# Patient Record
Sex: FEMALE | Race: WHITE | NOT HISPANIC OR LATINO | Employment: FULL TIME | URBAN - METROPOLITAN AREA
[De-identification: names, ages, dates, MRNs, and addresses within clinical notes are randomized per-mention and may not be internally consistent; named-entity substitution may affect disease eponyms.]

---

## 2021-08-03 DIAGNOSIS — Z11.59 ENCOUNTER FOR SCREENING FOR OTHER VIRAL DISEASES: ICD-10-CM

## 2021-08-16 RX ORDER — FOLIC ACID 1 MG/1
TABLET ORAL
COMMUNITY
Start: 2021-06-11 | End: 2024-10-03

## 2021-08-16 RX ORDER — PRENATAL VIT/IRON FUM/FOLIC AC 27MG-0.8MG
1 TABLET ORAL DAILY
COMMUNITY

## 2021-08-16 RX ORDER — LEVETIRACETAM 750 MG/1
750 TABLET ORAL 2 TIMES DAILY
COMMUNITY
Start: 2021-06-14

## 2021-08-16 ASSESSMENT — MIFFLIN-ST. JEOR: SCORE: 1556.37

## 2021-08-17 ENCOUNTER — ANESTHESIA EVENT (OUTPATIENT)
Dept: SURGERY | Facility: AMBULATORY SURGERY CENTER | Age: 31
End: 2021-08-17

## 2021-08-18 ENCOUNTER — ANESTHESIA (OUTPATIENT)
Dept: SURGERY | Facility: AMBULATORY SURGERY CENTER | Age: 31
End: 2021-08-18

## 2021-08-18 ENCOUNTER — HOSPITAL ENCOUNTER (OUTPATIENT)
Facility: AMBULATORY SURGERY CENTER | Age: 31
End: 2021-08-18
Attending: OBSTETRICS & GYNECOLOGY

## 2021-08-18 VITALS
SYSTOLIC BLOOD PRESSURE: 113 MMHG | DIASTOLIC BLOOD PRESSURE: 63 MMHG | BODY MASS INDEX: 36.44 KG/M2 | HEIGHT: 62 IN | RESPIRATION RATE: 16 BRPM | HEART RATE: 70 BPM | TEMPERATURE: 97.6 F | OXYGEN SATURATION: 98 % | WEIGHT: 198 LBS

## 2021-08-18 DIAGNOSIS — T83.32XD INTRAUTERINE DEVICE (IUD) MIGRATION, SUBSEQUENT ENCOUNTER: Primary | ICD-10-CM

## 2021-08-18 LAB
HCG UR QL: NEGATIVE
INTERNAL QC OK POCT: NORMAL

## 2021-08-18 RX ORDER — PROPOFOL 10 MG/ML
INJECTION, EMULSION INTRAVENOUS CONTINUOUS PRN
Status: DISCONTINUED | OUTPATIENT
Start: 2021-08-18 | End: 2021-08-18

## 2021-08-18 RX ORDER — SODIUM CHLORIDE, SODIUM LACTATE, POTASSIUM CHLORIDE, CALCIUM CHLORIDE 600; 310; 30; 20 MG/100ML; MG/100ML; MG/100ML; MG/100ML
INJECTION, SOLUTION INTRAVENOUS CONTINUOUS
Status: DISCONTINUED | OUTPATIENT
Start: 2021-08-18 | End: 2021-08-19 | Stop reason: HOSPADM

## 2021-08-18 RX ORDER — ONDANSETRON 4 MG/1
4 TABLET, ORALLY DISINTEGRATING ORAL EVERY 30 MIN PRN
Status: DISCONTINUED | OUTPATIENT
Start: 2021-08-18 | End: 2021-08-19 | Stop reason: HOSPADM

## 2021-08-18 RX ORDER — LIDOCAINE HYDROCHLORIDE 20 MG/ML
INJECTION, SOLUTION INFILTRATION; PERINEURAL PRN
Status: DISCONTINUED | OUTPATIENT
Start: 2021-08-18 | End: 2021-08-18

## 2021-08-18 RX ORDER — MEPERIDINE HYDROCHLORIDE 25 MG/ML
12.5 INJECTION INTRAMUSCULAR; INTRAVENOUS; SUBCUTANEOUS
Status: DISCONTINUED | OUTPATIENT
Start: 2021-08-18 | End: 2021-08-19 | Stop reason: HOSPADM

## 2021-08-18 RX ORDER — ACETAMINOPHEN 325 MG/1
975 TABLET ORAL ONCE
Status: COMPLETED | OUTPATIENT
Start: 2021-08-18 | End: 2021-08-18

## 2021-08-18 RX ORDER — FENTANYL CITRATE 50 UG/ML
25 INJECTION, SOLUTION INTRAMUSCULAR; INTRAVENOUS EVERY 5 MIN PRN
Status: CANCELLED | OUTPATIENT
Start: 2021-08-18

## 2021-08-18 RX ORDER — ACETAMINOPHEN 325 MG/1
975 TABLET ORAL ONCE
Status: DISCONTINUED | OUTPATIENT
Start: 2021-08-18 | End: 2021-08-19 | Stop reason: HOSPADM

## 2021-08-18 RX ORDER — LIDOCAINE 40 MG/G
CREAM TOPICAL
Status: DISCONTINUED | OUTPATIENT
Start: 2021-08-18 | End: 2021-08-19 | Stop reason: HOSPADM

## 2021-08-18 RX ORDER — OXYCODONE HYDROCHLORIDE 5 MG/1
5 TABLET ORAL EVERY 4 HOURS PRN
Status: DISCONTINUED | OUTPATIENT
Start: 2021-08-18 | End: 2021-08-19 | Stop reason: HOSPADM

## 2021-08-18 RX ORDER — IBUPROFEN 800 MG/1
800 TABLET, FILM COATED ORAL ONCE
Status: DISCONTINUED | OUTPATIENT
Start: 2021-08-18 | End: 2021-08-19 | Stop reason: HOSPADM

## 2021-08-18 RX ORDER — HYDROMORPHONE HCL IN WATER/PF 6 MG/30 ML
0.2 PATIENT CONTROLLED ANALGESIA SYRINGE INTRAVENOUS EVERY 5 MIN PRN
Status: CANCELLED | OUTPATIENT
Start: 2021-08-18

## 2021-08-18 RX ORDER — ALBUTEROL SULFATE 0.83 MG/ML
2.5 SOLUTION RESPIRATORY (INHALATION) EVERY 4 HOURS PRN
Status: CANCELLED | OUTPATIENT
Start: 2021-08-18

## 2021-08-18 RX ORDER — LIDOCAINE HYDROCHLORIDE 10 MG/ML
INJECTION, SOLUTION EPIDURAL; INFILTRATION; INTRACAUDAL; PERINEURAL PRN
Status: DISCONTINUED | OUTPATIENT
Start: 2021-08-18 | End: 2021-08-18 | Stop reason: HOSPADM

## 2021-08-18 RX ORDER — DEXAMETHASONE SODIUM PHOSPHATE 4 MG/ML
INJECTION, SOLUTION INTRA-ARTICULAR; INTRALESIONAL; INTRAMUSCULAR; INTRAVENOUS; SOFT TISSUE PRN
Status: DISCONTINUED | OUTPATIENT
Start: 2021-08-18 | End: 2021-08-18

## 2021-08-18 RX ORDER — KETOROLAC TROMETHAMINE 30 MG/ML
INJECTION, SOLUTION INTRAMUSCULAR; INTRAVENOUS PRN
Status: DISCONTINUED | OUTPATIENT
Start: 2021-08-18 | End: 2021-08-18

## 2021-08-18 RX ORDER — PROPOFOL 10 MG/ML
INJECTION, EMULSION INTRAVENOUS PRN
Status: DISCONTINUED | OUTPATIENT
Start: 2021-08-18 | End: 2021-08-18

## 2021-08-18 RX ORDER — ONDANSETRON 2 MG/ML
4 INJECTION INTRAMUSCULAR; INTRAVENOUS EVERY 30 MIN PRN
Status: DISCONTINUED | OUTPATIENT
Start: 2021-08-18 | End: 2021-08-19 | Stop reason: HOSPADM

## 2021-08-18 RX ORDER — ONDANSETRON 2 MG/ML
INJECTION INTRAMUSCULAR; INTRAVENOUS PRN
Status: DISCONTINUED | OUTPATIENT
Start: 2021-08-18 | End: 2021-08-18

## 2021-08-18 RX ORDER — IBUPROFEN 600 MG/1
600 TABLET, FILM COATED ORAL EVERY 6 HOURS PRN
Qty: 10 TABLET | Refills: 0 | Status: SHIPPED | OUTPATIENT
Start: 2021-08-18 | End: 2024-10-03

## 2021-08-18 RX ADMIN — PROPOFOL 30 MG: 10 INJECTION, EMULSION INTRAVENOUS at 08:42

## 2021-08-18 RX ADMIN — PROPOFOL 180 MCG/KG/MIN: 10 INJECTION, EMULSION INTRAVENOUS at 08:36

## 2021-08-18 RX ADMIN — LIDOCAINE HYDROCHLORIDE 60 MG: 20 INJECTION, SOLUTION INFILTRATION; PERINEURAL at 08:36

## 2021-08-18 RX ADMIN — PROPOFOL 20 MG: 10 INJECTION, EMULSION INTRAVENOUS at 08:39

## 2021-08-18 RX ADMIN — DEXAMETHASONE SODIUM PHOSPHATE 10 MG: 4 INJECTION, SOLUTION INTRA-ARTICULAR; INTRALESIONAL; INTRAMUSCULAR; INTRAVENOUS; SOFT TISSUE at 08:38

## 2021-08-18 RX ADMIN — ACETAMINOPHEN 975 MG: 325 TABLET ORAL at 08:09

## 2021-08-18 RX ADMIN — ONDANSETRON 4 MG: 2 INJECTION INTRAMUSCULAR; INTRAVENOUS at 08:36

## 2021-08-18 RX ADMIN — SODIUM CHLORIDE, SODIUM LACTATE, POTASSIUM CHLORIDE, CALCIUM CHLORIDE: 600; 310; 30; 20 INJECTION, SOLUTION INTRAVENOUS at 08:18

## 2021-08-18 RX ADMIN — KETOROLAC TROMETHAMINE 15 MG: 30 INJECTION, SOLUTION INTRAMUSCULAR; INTRAVENOUS at 08:47

## 2021-08-18 RX ADMIN — PROPOFOL 30 MG: 10 INJECTION, EMULSION INTRAVENOUS at 08:36

## 2021-08-18 ASSESSMENT — ENCOUNTER SYMPTOMS: SEIZURES: 1

## 2021-08-18 NOTE — DISCHARGE INSTRUCTIONS
Pt and family verbalize good understanding of discharge teach and follow up with MD.   VSS,Surgical incision CDI. D/C criteria met. Pt verbalizes readiness to go home.   Home with .     @[facility]@ 8/18/2021 9:31 AM

## 2021-08-18 NOTE — ANESTHESIA PREPROCEDURE EVALUATION
Anesthesia Pre-Procedure Evaluation    Patient: Lauren Montague   MRN: [id number] : 1990        Preoperative Diagnosis: Complication of intrauterine device (H) [T83.9XXA]   Procedure : Procedure(s):  HYSTEROSCOPY, REMOVAL OF INTRAUTERINE DEVICE     Past Medical History:   Diagnosis Date     Motion sickness      PONV (postoperative nausea and vomiting)      Seizures (H)     Epilepsy  Last       Past Surgical History:   Procedure Laterality Date     UTERINE FIBROID SURGERY        Allergies   Allergen Reactions     Morphine Nausea and Vomiting     Cat Hair Std Allergenic Ext [Cat Hair Extract] Unknown     Penicillins Swelling      Social History     Tobacco Use     Smoking status: Never Smoker     Smokeless tobacco: Never Used   Substance Use Topics     Alcohol use: Not Currently      Wt Readings from Last 1 Encounters:   21 89.8 kg (198 lb)        Anesthesia Evaluation            ROS/MED HX  ENT/Pulmonary:  - neg pulmonary ROS     Neurologic:     (+) peripheral neuropathy, seizures, last seizure: ,     Cardiovascular:  - neg cardiovascular ROS     METS/Exercise Tolerance:     Hematologic:  - neg hematologic  ROS     Musculoskeletal:       GI/Hepatic:  - neg GI/hepatic ROS     Renal/Genitourinary:  - neg Renal ROS     Endo:     (+) Obesity,     Psychiatric/Substance Use:       Infectious Disease:       Malignancy:       Other:            Physical Exam    Airway  airway exam normal           Respiratory Devices and Support         Dental  no notable dental history         Cardiovascular   cardiovascular exam normal          Pulmonary   pulmonary exam normal                OUTSIDE LABS:  CBC: No results found for: WBC, HGB, HCT, PLT  BMP: No results found for: NA, POTASSIUM, CHLORIDE, CO2, BUN, CR, GLC  COAGS: No results found for: PTT, INR, FIBR  POC: No results found for: BGM, HCG, HCGS  HEPATIC: No results found for: ALBUMIN, PROTTOTAL, ALT, AST, GGT, ALKPHOS, BILITOTAL, BILIDIRECT, MANDI  OTHER:  No results found for: PH, LACT, A1C, CHAVEZ, PHOS, MAG, LIPASE, AMYLASE, TSH, T4, T3, CRP, SED    Anesthesia Plan    ASA Status:  2      Anesthesia Type: MAC.     - Reason for MAC: straight local not clinically adequate              Consents    Anesthesia Plan(s) and associated risks, benefits, and realistic alternatives discussed. Questions answered and patient/representative(s) expressed understanding.     - Discussed with:  Patient         Postoperative Care    Pain management: IV analgesics, Oral pain medications.   PONV prophylaxis: Ondansetron (or other 5HT-3), Dexamethasone or Solumedrol     Comments:                Talisha Lam MD

## 2021-08-18 NOTE — OP NOTE
PROCEDURE NOTE - HYSTEROSCOPY     Name: Lauren Montague   : 1990   MRN: [id number]     DATE OF SERVICE:  2021     PREOPERATIVE DIAGNOSIS: Retained IUD    POSTOPERATIVE DIAGNOSIS: Same    PROCEDURES: Hysteroscopy, removal of IUD    SURGEON: Yo Lindquist MD     ASSISTANT: OR staff    ANESTHESIA:  mac    ESTIMATED BLOOD LOSS:   2 cc     HISTORY OF PRESENT ILLNESS:  This is a 31 year old female with history of retained IUD.  Ultrasound that showed the IUD in the uterus.  She was given informed consent for the above procedure. The risks, benefits and alternatives were discussed with her including but not exclusive to uterine perforation, bleeding and infection. She expressed understanding and wished to proceed.     PROCEDURE:  The patient was brought to the operating room and after induction of MAC anesthesia was prepped and draped in the dorsal lithotomy position. A time out was called and the patient and the procedure were verified.  A bimanual exam was done, indicating a small anteverted uterus. No other abnormalities were noted.     A sterile  speculum was then placed.  A paracervical block was given with 20 cc of 1% lidocaine divided in 9:00 and 3:00 o'clock.  The anterior lip of the cervix was grasped with a single tooth tenaculum.  Hysteroscope was placed in the cervix and the IUD string was seen.  The string was grasped and a Mirena IUD was completely removed.  Hemostasis was seen throughout.  Instruments were removed from vagina.  Sponge and needle counts were correct X 2. She was taken to recovery in stable condition.    @ME2@       CC: No primary care provider on file., Yo Lindquist MD

## 2021-08-18 NOTE — ANESTHESIA POSTPROCEDURE EVALUATION
Patient: Lauren Montague    Procedure(s):  HYSTEROSCOPY, REMOVAL OF INTRAUTERINE DEVICE    Diagnosis:Complication of intrauterine device (H) [T83.9XXA]  Diagnosis Additional Information: No value filed.    Anesthesia Type:  MAC    Note:  Disposition: Outpatient   Postop Pain Control: Uneventful            Sign Out: Well controlled pain   PONV: No   Neuro/Psych: Uneventful            Sign Out: Acceptable/Baseline neuro status   Airway/Respiratory: Uneventful            Sign Out: Acceptable/Baseline resp. status   CV/Hemodynamics: Uneventful            Sign Out: Acceptable CV status   Other NRE: NONE   DID A NON-ROUTINE EVENT OCCUR? No           Last vitals:  Vitals Value Taken Time   /63 08/18/21 0909   Temp 97.6  F (36.4  C) 08/18/21 0901   Pulse 72 08/18/21 0913   Resp 16 08/18/21 0901   SpO2 98 % 08/18/21 0913   Vitals shown include unvalidated device data.    Electronically Signed By: Talisha Lam MD  August 18, 2021  9:17 AM

## 2021-08-21 ENCOUNTER — HEALTH MAINTENANCE LETTER (OUTPATIENT)
Age: 31
End: 2021-08-21

## 2021-10-16 ENCOUNTER — HEALTH MAINTENANCE LETTER (OUTPATIENT)
Age: 31
End: 2021-10-16

## 2021-12-09 ENCOUNTER — TRANSFERRED RECORDS (OUTPATIENT)
Dept: HEALTH INFORMATION MANAGEMENT | Facility: CLINIC | Age: 31
End: 2021-12-09

## 2022-06-28 ENCOUNTER — VIRTUAL VISIT (OUTPATIENT)
Dept: PEDIATRICS | Facility: CLINIC | Age: 32
End: 2022-06-28
Payer: COMMERCIAL

## 2022-06-28 DIAGNOSIS — Z33.1 PREGNANT STATE, INCIDENTAL: ICD-10-CM

## 2022-06-28 DIAGNOSIS — G40.109 LOCALIZATION-RELATED FOCAL EPILEPSY WITH SIMPLE PARTIAL SEIZURES (H): ICD-10-CM

## 2022-06-28 DIAGNOSIS — U07.1 INFECTION DUE TO 2019 NOVEL CORONAVIRUS: Primary | ICD-10-CM

## 2022-06-28 PROCEDURE — 99203 OFFICE O/P NEW LOW 30 MIN: CPT | Mod: CS | Performed by: INTERNAL MEDICINE

## 2022-06-28 NOTE — PROGRESS NOTES
"Lauren is a 32 year old who is being evaluated via a billable video visit.      How would you like to obtain your AVS? MyChart  If the video visit is dropped, the invitation should be resent by: [email or web address]  Will anyone else be joining your video visit? No        Assessment & Plan       ICD-10-CM    1. Infection due to 2019 novel coronavirus  U07.1 nirmatrelvir and ritonavir (PAXLOVID) therapy pack   2. Pregnant state, incidental  Z33.1 nirmatrelvir and ritonavir (PAXLOVID) therapy pack   3. Partial Simple Seizure  G40.109        Pt meets criteria for treatment on day 2 of illness without contraindication or medication interactions.     BMI:   Estimated body mass index is 36.21 kg/m  as calculated from the following:    Height as of 8/16/21: 1.575 m (5' 2\").    Weight as of 8/16/21: 89.8 kg (198 lb).       See Patient Instructions    No follow-ups on file.    Miryam Wilder MD  [facility] is a 32 year [facility], presenting for the following health issues:  No chief complaint on file.      HPI       COVID-19 Symptom Review  How many days ago did these symptoms start? X 1 day    Are any of the following symptoms significant for you?    New or worsening difficulty breathing? No    Worsening cough? Yes, it's a dry cough.     Fever or chills? No    Headache: no    Sore throat: YES    Chest pain: no    Diarrhea: YES    Body aches? YES has been sleeping a lot so body aches getting up     What treatments has patient tried? None    Does patient live in a nursing home, group home, or shelter? no  Does patient have a way to get food/medications during quarantined? Yes, I have a friend or family member who can help me.    Symptoms started yesterday  Test negative yesterday  Day 2 of illness        Review of Systems   All other systems on a 10-point review are negative, unless otherwise noted in HPI        Objective           Vitals:  No vitals were obtained today " due to virtual visit.    Physical Exam   GENERAL: Healthy, alert and no distress  EYES: Eyes grossly normal to inspection.  No discharge or erythema, or obvious scleral/conjunctival abnormalities.  RESP: No audible wheeze, cough, or visible cyanosis.  No visible retractions or increased work of breathing.    SKIN: Visible skin clear. No significant rash, abnormal pigmentation or lesions.  NEURO: Cranial nerves grossly intact.  Mentation and speech appropriate for age.  PSYCH: Mentation appears normal, affect normal/bright, judgement and insight intact, normal speech and appearance well-groomed.                Video-Visit Details    Video Start Time: 4:45 PM (unable to connect via [employer], switched to [employer] at 4:49 PM.    Type of service:  Video Visit    Video End Time:5:03 PM    Originating Location (pt. Location): Home    Distant Location (provider location):  [facility]     Platform used for Video Visit: [employer]    .  ..

## 2022-07-22 ENCOUNTER — TRANSFERRED RECORDS (OUTPATIENT)
Dept: HEALTH INFORMATION MANAGEMENT | Facility: CLINIC | Age: 32
End: 2022-07-22

## 2022-07-22 ENCOUNTER — LAB REQUISITION (OUTPATIENT)
Dept: LAB | Facility: CLINIC | Age: 32
End: 2022-07-22

## 2022-07-22 DIAGNOSIS — Z36.85 ENCOUNTER FOR ANTENATAL SCREENING FOR STREPTOCOCCUS B: ICD-10-CM

## 2022-07-22 PROCEDURE — 87077 CULTURE AEROBIC IDENTIFY: CPT | Performed by: OBSTETRICS & GYNECOLOGY

## 2022-07-26 LAB — BACTERIA SPEC CULT: ABNORMAL

## 2022-07-29 ENCOUNTER — LAB REQUISITION (OUTPATIENT)
Dept: LAB | Facility: CLINIC | Age: 32
End: 2022-07-29

## 2022-07-29 DIAGNOSIS — Z3A.31 31 WEEKS GESTATION OF PREGNANCY: ICD-10-CM

## 2022-07-29 DIAGNOSIS — G40.909 EPILEPSY, UNSPECIFIED, NOT INTRACTABLE, WITHOUT STATUS EPILEPTICUS (H): ICD-10-CM

## 2022-07-29 PROCEDURE — 80177 DRUG SCRN QUAN LEVETIRACETAM: CPT | Performed by: OBSTETRICS & GYNECOLOGY

## 2022-07-30 LAB — LEVETIRACETAM SERPL-MCNC: 15.4 ΜG/ML (ref 10–40)

## 2022-08-12 ENCOUNTER — TRANSFERRED RECORDS (OUTPATIENT)
Dept: HEALTH INFORMATION MANAGEMENT | Facility: CLINIC | Age: 32
End: 2022-08-12

## 2022-08-25 PROBLEM — Z36.89 ENCOUNTER FOR TRIAGE IN PREGNANT PATIENT: Status: ACTIVE | Noted: 2022-08-25

## 2022-08-25 PROBLEM — Z34.90 ENCOUNTER FOR ELECTIVE INDUCTION OF LABOR: Status: ACTIVE | Noted: 2022-08-25

## 2022-08-26 ENCOUNTER — ANESTHESIA (OUTPATIENT)
Dept: OBGYN | Facility: CLINIC | Age: 32
End: 2022-08-26
Payer: COMMERCIAL

## 2022-08-26 ENCOUNTER — ANESTHESIA EVENT (OUTPATIENT)
Dept: OBGYN | Facility: CLINIC | Age: 32
End: 2022-08-26
Payer: COMMERCIAL

## 2022-08-26 PROCEDURE — 250N000011 HC RX IP 250 OP 636: Performed by: NURSE ANESTHETIST, CERTIFIED REGISTERED

## 2022-08-26 PROCEDURE — 250N000009 HC RX 250: Performed by: NURSE ANESTHETIST, CERTIFIED REGISTERED

## 2022-08-26 PROCEDURE — 250N000011 HC RX IP 250 OP 636: Performed by: OBSTETRICS & GYNECOLOGY

## 2022-08-26 PROCEDURE — 258N000003 HC RX IP 258 OP 636: Performed by: OBSTETRICS & GYNECOLOGY

## 2022-08-26 PROCEDURE — 250N000009 HC RX 250: Performed by: OBSTETRICS & GYNECOLOGY

## 2022-08-26 RX ORDER — LIDOCAINE HYDROCHLORIDE 10 MG/ML
INJECTION, SOLUTION INFILTRATION; PERINEURAL PRN
Status: DISCONTINUED | OUTPATIENT
Start: 2022-08-26 | End: 2022-08-26

## 2022-08-26 RX ORDER — FENTANYL CITRATE 50 UG/ML
INJECTION, SOLUTION INTRAMUSCULAR; INTRAVENOUS PRN
Status: DISCONTINUED | OUTPATIENT
Start: 2022-08-26 | End: 2022-08-26

## 2022-08-26 RX ORDER — PROPOFOL 10 MG/ML
INJECTION, EMULSION INTRAVENOUS PRN
Status: DISCONTINUED | OUTPATIENT
Start: 2022-08-26 | End: 2022-08-26

## 2022-08-26 RX ORDER — DEXAMETHASONE SODIUM PHOSPHATE 10 MG/ML
INJECTION, SOLUTION INTRAMUSCULAR; INTRAVENOUS PRN
Status: DISCONTINUED | OUTPATIENT
Start: 2022-08-26 | End: 2022-08-26

## 2022-08-26 RX ADMIN — PROPOFOL 200 MG: 10 INJECTION, EMULSION INTRAVENOUS at 14:28

## 2022-08-26 RX ADMIN — DEXAMETHASONE SODIUM PHOSPHATE 10 MG: 10 INJECTION, SOLUTION INTRAMUSCULAR; INTRAVENOUS at 14:49

## 2022-08-26 RX ADMIN — KETOROLAC TROMETHAMINE 15 MG: 30 INJECTION, SOLUTION INTRAMUSCULAR; INTRAVENOUS at 15:04

## 2022-08-26 RX ADMIN — SODIUM CHLORIDE, POTASSIUM CHLORIDE, SODIUM LACTATE AND CALCIUM CHLORIDE: 600; 310; 30; 20 INJECTION, SOLUTION INTRAVENOUS at 15:21

## 2022-08-26 RX ADMIN — ONDANSETRON 4 MG: 2 INJECTION INTRAMUSCULAR; INTRAVENOUS at 14:49

## 2022-08-26 RX ADMIN — LIDOCAINE HYDROCHLORIDE 2 ML: 10 INJECTION, SOLUTION INFILTRATION; PERINEURAL at 14:28

## 2022-08-26 RX ADMIN — FENTANYL CITRATE 100 MCG: 50 INJECTION, SOLUTION INTRAMUSCULAR; INTRAVENOUS at 14:34

## 2022-08-26 RX ADMIN — FENTANYL CITRATE 100 MCG: 50 INJECTION, SOLUTION INTRAMUSCULAR; INTRAVENOUS at 15:26

## 2022-08-26 RX ADMIN — METHYLERGONOVINE MALEATE 200 MCG: 0.2 INJECTION INTRAVENOUS at 14:35

## 2022-08-26 RX ADMIN — Medication 600 ML/HR: at 14:33

## 2022-08-26 RX ADMIN — CLINDAMYCIN PHOSPHATE 900 MG: 900 INJECTION, SOLUTION INTRAVENOUS at 14:24

## 2022-08-26 RX ADMIN — Medication 100 MG: at 14:28

## 2022-08-26 RX ADMIN — HYDROMORPHONE HYDROCHLORIDE 1 MG: 1 INJECTION, SOLUTION INTRAMUSCULAR; INTRAVENOUS; SUBCUTANEOUS at 14:43

## 2022-08-26 ASSESSMENT — ENCOUNTER SYMPTOMS: SEIZURES: 1

## 2022-08-26 NOTE — ANESTHESIA CARE TRANSFER NOTE
Patient: Lauren Montague    Procedure: Procedure(s):   SECTION  Cervical Ripening    Diagnosis: Thrombocytopenia, unspecified (H) [D69.6]  Diagnosis Additional Information: No value filed.    Anesthesia Type:   General     Note:    Oropharynx: oropharynx clear of all foreign objects  Level of Consciousness: awake  Oxygen Supplementation: nasal cannula  Level of Supplemental Oxygen (L/min / FiO2): 2  Independent Airway: airway patency satisfactory and stable  Dentition: dentition unchanged  Vital Signs Stable: post-procedure vital signs reviewed and stable  Report to RN Given: handoff report given  Patient transferred to: PACU    Handoff Report: Identifed the Patient, Identified the Reponsible Provider, Reviewed the pertinent medical history, Discussed the surgical course, Reviewed Intra-OP anesthesia mangement and issues during anesthesia, Set expectations for post-procedure period and Allowed opportunity for questions and acknowledgement of understanding      Vitals:  Vitals Value Taken Time   /84 22 1550   Temp 36.4  C (97.5  F) 22 1538   Pulse 69 22 1551   Resp 15 22 1551   SpO2 100 % 22 1551   Vitals shown include unvalidated device data.    Electronically Signed By: CHERIE He CRNA  2022  3:52 PM  
numerical 0-10

## 2022-08-26 NOTE — ANESTHESIA POSTPROCEDURE EVALUATION
Patient: Lauren Montague    Procedure: Procedure(s):   SECTION  Cervical Ripening    Anesthesia Type:  General    Note:  Disposition: Inpatient   Postop Pain Control: Uneventful            Sign Out: Well controlled pain   PONV: No   Neuro/Psych: Uneventful            Sign Out: Acceptable/Baseline neuro status   Airway/Respiratory: Uneventful            Sign Out: Acceptable/Baseline resp. status   CV/Hemodynamics: Uneventful            Sign Out: Acceptable CV status; No obvious hypovolemia; No obvious fluid overload   Other NRE:    DID A NON-ROUTINE EVENT OCCUR? No           Last vitals:  Vitals Value Taken Time   /81 22 1610   Temp 36.2  C (97.2  F) 22 1605   Pulse 66 22 1612   Resp 16 22 1612   SpO2 100 % 22 1612   Vitals shown include unvalidated device data.    Electronically Signed By: Berny Cifuentes MD  2022  4:26 PM

## 2022-08-26 NOTE — ANESTHESIA PROCEDURE NOTES
Airway       Patient location during procedure: OR       Procedure Start/Stop Times: 8/26/2022 2:29 PM  Staff -        CRNA: Albert Harrison APRN CRNA       Performed By: CRNA  Consent for Airway        Urgency: elective  Indications and Patient Condition       Indications for airway management: angie-procedural       Induction type:RSI       Mask difficulty assessment: 0 - not attempted    Final Airway Details       Final airway type: endotracheal airway       Successful airway: ETT - single  Endotracheal Airway Details        ETT size (mm): 7.0       Cuffed: yes       Successful intubation technique: direct laryngoscopy       DL Blade Type: Carter 2       Grade View of Cords: 1       Adjucts: stylet       Position: Right       Measured from: lips       Secured at (cm): 23    Post intubation assessment        Placement verified by: capnometry, equal breath sounds and chest rise        Number of attempts at approach: 1       Number of other approaches attempted: 0       Secured with: silk tape       Ease of procedure: easy       Dentition: Intact and Unchanged    Medication(s) Administered   Medication Administration Time: 8/26/2022 2:29 PM

## 2022-08-26 NOTE — ANESTHESIA PREPROCEDURE EVALUATION
Anesthesia Pre-Procedure Evaluation    Patient: Lauren Montague   MRN: [id number] : 1990        Procedure : Procedure(s):   SECTION  Cervical Ripening       Past Medical History:   Diagnosis Date     Motion sickness      PONV (postoperative nausea and vomiting)      Seizures (H)     Epilepsy  Last       Past Surgical History:   Procedure Laterality Date     DILATION AND CURETTAGE, OPERATIVE HYSTEROSCOPY, COMBINED N/A 2021    Procedure: HYSTEROSCOPY, REMOVAL OF INTRAUTERINE DEVICE;  Surgeon: Lexa, Yo Ring MD;  Location: [facility]     UTERINE FIBROID SURGERY        Allergies   Allergen Reactions     Morphine Nausea and Vomiting     Penicillins Swelling     Cat Hair Std Allergenic Ext [Cat Hair Extract] Unknown      Social History     Tobacco Use     Smoking status: Never Smoker     Smokeless tobacco: Never Used   Substance Use Topics     Alcohol use: Not Currently      Wt Readings from Last 1 Encounters:   22 99.8 kg (220 lb)        Anesthesia Evaluation   Pt has had prior anesthetic.     History of anesthetic complications  - PONV.      ROS/MED HX  ENT/Pulmonary:       Neurologic:     (+) seizures,     Cardiovascular:       METS/Exercise Tolerance:     Hematologic:       Musculoskeletal:       GI/Hepatic:       Renal/Genitourinary:       Endo:     (+) Obesity,     Psychiatric/Substance Use:       Infectious Disease:       Malignancy:       Other:            Physical Exam    Airway  airway exam normal       TM distance: > 3 FB   Neck ROM: full     Respiratory Devices and Support         Dental  no notable dental history         Cardiovascular   cardiovascular exam normal          Pulmonary   pulmonary exam normal                OUTSIDE LABS:  CBC:   Lab Results   Component Value Date    WBC 9.4 2022    WBC 9.2 2022    HGB 13.2 2022    HGB 12.9 2022    HCT 39.6 2022    HCT 38.3 2022    PLT 68 (L) 2022    PLT 64 (L) 2022      BMP: No results found for: NA, POTASSIUM, CHLORIDE, CO2, BUN, CR, GLC  COAGS:   Lab Results   Component Value Date    PTT 26 08/26/2022    INR 1.06 08/26/2022     POC:   Lab Results   Component Value Date    HCG Negative 08/18/2021     HEPATIC: No results found for: ALBUMIN, PROTTOTAL, ALT, AST, GGT, ALKPHOS, BILITOTAL, BILIDIRECT, MANDI  OTHER: No results found for: PH, LACT, A1C, CHAVEZ, PHOS, MAG, LIPASE, AMYLASE, TSH, T4, T3, CRP, SED    Anesthesia Plan    ASA Status:  4   NPO Status:  NPO Appropriate    Anesthesia Type: General.     - Airway: ETT   Induction: RSI, Intravenous, Propofol.   Maintenance: Inhalation.   Techniques and Equipment:     - Airway: Video-Laryngoscope         Consents    Anesthesia Plan(s) and associated risks, benefits, and realistic alternatives discussed. Questions answered and patient/representative(s) expressed understanding.     - Discussed: Risks, Benefits and Alternatives for BOTH SEDATION and the PROCEDURE were discussed     - Discussed with:  Patient         Postoperative Care    Pain management: IV analgesics, Oral pain medications.   PONV prophylaxis: Ondansetron (or other 5HT-3), Dexamethasone or Solumedrol     Comments:    Other Comments: Family hx of ITP- platelets 68 on redraw this afternoon            Berny Cifuentes MD

## 2022-08-29 ENCOUNTER — TELEPHONE (OUTPATIENT)
Dept: PEDIATRICS | Facility: CLINIC | Age: 32
End: 2022-08-29

## 2022-08-30 NOTE — TELEPHONE ENCOUNTER
Late entry from 8/29.    Received a message from a patient rep regarding mom seeking out lactation support.    Called patient. Tearful on the phone, states she's overwhelmed. Per chart review, discharged home s/p c/s on Sunday.     States prior to discharge nursing was going well but once home and their first night home they had a lot of difficulty.     Latch  - good latch but sleepy  - will pop on/off     Breasts  - engorged  - tender     Supply  - starting to come in  - not getting anything out with pumping but will get some drops out with hand expression     Educated on timeline of when milk supply comes in, engorgement and management.   - warm compress prior to nursing   - hand massage during nursing  - stimulation to keep infant awake (diaper changes, switching breast, tickling, removing layers, pausing feedings)  - cold compress after feedings for swelling  - tylenol/ibuprofen for discomfort     Pumping  - PRN   - advised to review manual as well as mfg web site for helpful tips on proper use     Feeding  - encouraged every 2 hour feedings  - encouraged on demand feedings based of hunger cues  - educated an avg breast fed infant may eat any where from 1-3 hours.     Follow up  - infant will be seen outside [facility] system, encouraged mom to make appt and ask about lactation support at there peds office  - per discharge, supposed to have [facility] visit, they will call today  - unable to schedule with this IBCLC due to billing/non-est pt with [facility]. Encouraged to call back if needing to get scheduled with another IBCLC in the system.     Patient verbalized understanding, reports she feels better after phone call, will call back as needed.

## 2022-09-07 NOTE — PROGRESS NOTES
"Assessment:   1.  2 1/2 week old infant with initial slow weight gain (not returned to birthweight at 2 weeks), now gaining weight on breastfeeding and some supplementation with pumped milk  2.  Good latch and suck after assistance with positioning, with milk transfer adequate to baby's needs during observed feeding in office today  3.  Mother with normal milk supply    Plan:     1.  Use good positioning for deep latch, with baby held close to body and baby's head/shoulders/hips in good alignment.  When in a seated position, use a pillow to help bring baby close to breasts, and stepstool to elevate your knees above hips.  Be certain that John ley is always positioned next to yours so that his body is in a straight line (head not turned to nurse)  2.   Present breast in the \"sandwich\" hold, compressing breast vertically and in line with baby's mouth, for baby to get a larger mouthful of breast and a deeper latch.  If there is pinching or pain, try using a finger to give a little gentle pressure on his chin to help him open more widely and take in more of your breast.  If it is still painful, use a finger to break the suction, remove baby from the breast and try again until there is no pain with nursing.  There is sometimes a little pain when the baby first begins sucking, but after the first few seconds there should be no pain--only a tugging feeling.  3.  Babies latch best to the breast by bringing their chin in first, so point your nipple towards baby's nose, tuck the chin in close, and then wait for his mouth to open.  When his mouth opens, bring his head in deeply.  Baby's chin should be snugged deeply in your breast, their upper cheeks should be touching the breast, and their nose just out of the breast.  4.  To continue to nurse baby on cue, 8-12 times each day.  Feed on one side until baby finishes swallowing.  Once swallowing slows, use breast compression to encourage more swallowing, but once there is " "no more active swallowing, and baby is either sleeping, coming off the breast, or just \"nibbling,\" it is OK to use a finger to take baby off the breast and move to the other breast.  You do not need to wait until he comes off on his own, as newborns often will stay at the breast for long periods of time even if they are not actively drinking.  Do the same on the other side.  Offer both breasts at each feeding.  It is more important to watch the baby than the clock!   5.  Discussed that sometimes if babies have slow weight gain in early days, they can become more sleepy and less productive at breast, which leads to lower intake, lower milk supply and further poor gain.  Explained that helping baby catch up on gain will often lead to more energetic nursing, more intake, and better supply.  6.  Stanton needs about 22 oz of milk each day to grow well.  If he nurses at home as he did in the office today, about 10 times/day, he is getting enough milk.  If he does not nurse as productively, however, he may not be getting enough at a feeding.  If he nurses well, with good swallowing and appearing content after feeding, you do not need to give any extra milk.  If you feel he does not nurse productively, however, then pump a bit and supplement Stanton with 1 - 1 /2 oz of milk in a bottle.  Use the paced feeding method when giving bottles.    7.  Continue pumping as you have been to have this extra milk to offer to Stanton and promote strong milk supply.  Sometimes pumping while you have some warmth on your breasts (like a heating pad or microwaved hot pack) is helpful, and gentle massage can also help release more milk.  8.  Continue weaning from the nipple shield as you have been--he is doing well at most feedings, but if he is unable to latch occasionally, then it is OK to use.     9.  See pediatric provider as planned, and lactation for follow up in 1-2 weeks.  MyChart can be used for brief questions, but it's important to " "know that messages are not seen Friday through . If urgent help is needed, Monday through Friday you can call [phone] and one of our lactation consultants will get the message and respond; if you need a rapid response over a weekend or holiday, it is best to call your on-call maternity or pediatric provider.  Please feel free to schedule a return visit if the concern is more detailed;  telephone visits are also an option if you don't feel you need to be seen in person.    Subjective: Lauren and Max are here today because Lauren is concerned about baby Stanton's milk transfer--reports that baby is sleepy at breast, frequently latches/unlatches, \"takes forever\" to nurse, and sometimes appears hungry 1-2 hours after feeding. Concerned that baby is not latching deeply--not \"pulling in\" at breast.  Had been using nipple shield, but has now weaned away from that.  Baby had some slow initial weight gain, so was advised to pump and give bottles of breastmilk at night, as well as occasionally supplementing with pumped milk after daytime feedings.    Lauren is vaccinated for Covid-19 and has had the disease.    Hospital Course:  due to history of myomectomy;  Birth complicated by PPH of 1000 ml.  Seen by hospital IBCLC for difficult latch (after good start with good latch)--educated, support given and provided with nipple shield.    Mother's Relevant Med/Surg History: Covid in pregnancy, myomectomy, seizure disorder on Keppra, thrombocytopenia, anxiety    Breast Surgery: none    Breastfeeding Goals: continued breastfeeding    Previous Breastfeeding Experience: none, first baby    Infant's name: Stanton  Infant's bday: 22  Gestational age: 41w1d  Infant's birth weight: 8 # 0 oz  Discharge weight: 7 # 6.9 oz  Recent weights:  22:  7 # 10 oz     Mode of delivery:   Pediatric Provider: Maxime Putnam.  Lauren gives her permission for today's note to be forwarded to Dr. Dobbins.  CHIRAG " signed and filed in Lauren's chart as Stanton has no local active pediatric chart.      Frequency and duration of feedings: every 1-3 hours, for 20-45 min each feeding  Swallows audible per mother: yes  Numbers of feedings in 24 hours: 12  Number urines per day: 8 - 10  Number of stools per day and their color: 7-9     Supplementation: 4-5/day (at least 3 times at night) with about 2 oz  Pumpin times/day, yielding 2 1/2 - 3 1/2 oz using Zomee pump    Objective/Physical exam:   Mother: Noticed breasts grew larger and areolas darkened during pregnancy and she noticed primary engorgement when her milk came in on day 4  Her nipples are everted, the areola is compressible, the breast is soft and full.     Sore nipples: none  EPDS: 7    Assessment of infant: 21.19% Weight for age percentile   Age today: 2 1/2 weeks  Today's weight: 7 # 14.4 oz  Amount of milk transferred from LEFT side: 2.2 oz  Amount of milk transferred from RIGHT side: 0.4 oz    Baby has full flexion of arms and legs, normal tone, behavior is alert and active, respirations are normal, skin is normal, hydration is normal, jaw is normal size and alignment, palate is normal, frenulum is normal, baby can lateralize tongue, has adequate tongue lift, and tongue can protrude past bottom gum line. Upper labial frenulum is normal.    Suck exam:  Baby has strong, coordinated suck with good tongue cupping around examining finger, although loses suction easily with gentle chin pressure    Baby thrush: none   Jaundice: none     Feeding assessment: Baby can hold suction with tongue while at the breast without use of nipple shield today.    Alignment: The baby was flex relaxed. Baby's head was aligned with its trunk. Baby did face mother. Baby was in cross-cradle and football positions today.     Areolar Grasp: Baby was able to open mouth widely. Baby's lips were not pursed. Baby's lips did flange outward. Tongue was visible over bottom gum. Baby had complete  seal.     Areolar Compression: Baby made rhythmic motion. There were no clicking or smacking sounds. There was no severe nipple discomfort. Nipples appeared rounded after feeding.    Audible swallowing: Baby made quiet sounds of swallowing: There was an increase in frequency after milk ejection reflex. The milk ejection reflex is normal and milk supply is normal.     /84 (BP Location: Left arm, Patient Position: Sitting, Cuff Size: Adult Regular)   Breastfeeding Yes   OB History    Para Term  AB Living   1 1 1 0 0 1   SAB IAB Ectopic Multiple Live Births   0 0 0 0 1      # Outcome Date GA Lbr [place]/2nd Weight Sex Delivery Anes PTL Lv   1 Term 22 41w1d  3.629 kg (8 lb) M CS-LTranv Gen N LATISHA      Name: YANIV GONZALES      Apgar1: 10  Apgar5: 10       Current Outpatient Medications:      folic acid (FOLVITE) 1 MG tablet, , Disp: , Rfl:      ibuprofen (ADVIL/MOTRIN) 600 MG tablet, Take 1 tablet (600 mg) by mouth every 6 hours as needed for moderate pain, Disp: 10 tablet, Rfl: 0     levETIRAcetam (KEPPRA) 750 MG tablet, 750 mg 2 times daily , Disp: , Rfl:      loratadine (CLARITIN) 10 MG tablet, Take 10 mg by mouth daily, Disp: , Rfl:      Prenatal Vit-Fe Fumarate-FA (PRENATAL MULTIVITAMIN W/IRON) 27-0.8 MG tablet, Take 1 tablet by mouth daily, Disp: , Rfl:   Past Medical History:   Diagnosis Date     Motion sickness      PONV (postoperative nausea and vomiting)      Seizures (H)     Epilepsy  Last      Past Surgical History:   Procedure Laterality Date      SECTION N/A 2022    Procedure:  SECTION;  Surgeon: Yo Lindquist MD;  Location: [facility] OR     DILATION AND CURETTAGE, OPERATIVE HYSTEROSCOPY, COMBINED N/A 2021    Procedure: HYSTEROSCOPY, REMOVAL OF INTRAUTERINE DEVICE;  Surgeon: Yo Lindquist MD;  Location: [facility] OR     UTERINE FIBROID SURGERY       No family history on file.    Time spent:  Chart review/prechartin min  prior to day of service  Face-to-face visit:   58 min   Documentation:  16 min  Total time spent on day of service: 74 min    CHERIE Gastelum, CNM, IBCLC

## 2022-09-13 ENCOUNTER — ALLIED HEALTH/NURSE VISIT (OUTPATIENT)
Dept: MIDWIFE SERVICES | Facility: CLINIC | Age: 32
End: 2022-09-13
Payer: COMMERCIAL

## 2022-09-13 VITALS — SYSTOLIC BLOOD PRESSURE: 116 MMHG | DIASTOLIC BLOOD PRESSURE: 84 MMHG

## 2022-09-13 DIAGNOSIS — O92.79 OTHER DISORDERS OF LACTATION: Primary | ICD-10-CM

## 2022-09-13 PROCEDURE — 99205 OFFICE O/P NEW HI 60 MIN: CPT | Performed by: ADVANCED PRACTICE MIDWIFE

## 2022-09-13 RX ORDER — LORATADINE 10 MG/1
10 TABLET ORAL DAILY
COMMUNITY

## 2022-09-13 ASSESSMENT — EDINBURGH POSTNATAL DEPRESSION SCALE (EPDS)
I HAVE FELT SAD OR MISERABLE: NOT VERY OFTEN
THINGS HAVE BEEN GETTING ON TOP OF ME: YES, SOMETIMES I HAVEN'T BEEN COPING AS WELL AS USUAL
I HAVE BLAMED MYSELF UNNECESSARILY WHEN THINGS WENT WRONG: NOT VERY OFTEN
THE THOUGHT OF HARMING MYSELF HAS OCCURRED TO ME: NEVER
I HAVE BEEN ABLE TO LAUGH AND SEE THE FUNNY SIDE OF THINGS: AS MUCH AS I ALWAYS COULD
I HAVE BEEN ANXIOUS OR WORRIED FOR NO GOOD REASON: HARDLY EVER
I HAVE LOOKED FORWARD WITH ENJOYMENT TO THINGS: AS MUCH AS I EVER DID
TOTAL SCORE: 7
I HAVE FELT SCARED OR PANICKY FOR NO GOOD REASON: NO, NOT AT ALL
I HAVE BEEN SO UNHAPPY THAT I HAVE BEEN CRYING: ONLY OCCASIONALLY
I HAVE BEEN SO UNHAPPY THAT I HAVE HAD DIFFICULTY SLEEPING: NOT VERY OFTEN

## 2022-09-13 NOTE — PATIENT INSTRUCTIONS
"  1.  Use good positioning for deep latch, with baby held close to body and baby's head/shoulders/hips in good alignment.  When in a seated position, use a pillow to help bring baby close to breasts, and stepstool to elevate your knees above hips.  Be certain that John tummaryjane is always positioned next to yours so that his body is in a straight line (head not turned to nurse)  2.   Present breast in the \"sandwich\" hold, compressing breast vertically and in line with baby's mouth, for baby to get a larger mouthful of breast and a deeper latch.  If there is pinching or pain, try using a finger to give a little gentle pressure on his chin to help him open more widely and take in more of your breast.  If it is still painful, use a finger to break the suction, remove baby from the breast and try again until there is no pain with nursing.  There is sometimes a little pain when the baby first begins sucking, but after the first few seconds there should be no pain--only a tugging feeling.  3.  Babies latch best to the breast by bringing their chin in first, so point your nipple towards baby's nose, tuck the chin in close, and then wait for his mouth to open.  When his mouth opens, bring his head in deeply.  Baby's chin should be snugged deeply in your breast, their upper cheeks should be touching the breast, and their nose just out of the breast.  4.  To continue to nurse baby on cue, 8-12 times each day.  Feed on one side until baby finishes swallowing.  Once swallowing slows, use breast compression to encourage more swallowing, but once there is no more active swallowing, and baby is either sleeping, coming off the breast, or just \"nibbling,\" it is OK to use a finger to take baby off the breast and move to the other breast.  You do not need to wait until he comes off on his own, as newborns often will stay at the breast for long periods of time even if they are not actively drinking.  Do the same on the other side.  " Offer both breasts at each feeding.  It is more important to watch the baby than the clock!   5.  Discussed that sometimes if babies have slow weight gain in early days, they can become more sleepy and less productive at breast, which leads to lower intake, lower milk supply and further poor gain.  Explained that helping baby catch up on gain will often lead to more energetic nursing, more intake, and better supply.  6.  Stanton needs about 22 oz of milk each day to grow well.  If he nurses at home as he did in the office today, about 10 times/day, he is getting enough milk.  If he does not nurse as productively, however, he may not be getting enough at a feeding.  If he nurses well, with good swallowing and appearing content after feeding, you do not need to give any extra milk.  If you feel he does not nurse productively, however, then pump a bit and supplement Stanton with 1 - 1 /2 oz of milk in a bottle.  Use the paced feeding method when giving bottles.    7.  Continue pumping as you have been to have this extra milk to offer to Stanton and promote strong milk supply.  Sometimes pumping while you have some warmth on your breasts (like a heating pad or microwaved hot pack) is helpful, and gentle massage can also help release more milk.  8.  Continue weaning from the nipple shield as you have been--he is doing well at most feedings, but if he is unable to latch occasionally, then it is OK to use.     9.  See pediatric provider as planned, and lactation for follow up in 1-2 weeks.  [facility] can be used for brief questions, but it's important to know that messages are not seen Friday through Sunday. If urgent help is needed, Monday through Friday you can call [phone] and one of our lactation consultants will get the message and respond; if you need a rapid response over a weekend or holiday, it is best to call your on-call maternity or pediatric provider.  Please feel free to schedule a return visit if the  concern is more detailed;  telephone visits are also an option if you don't feel you need to be seen in person.    _____________    How to know if your baby is swallowing enough milk at the breast:      https://globalhealthmedia.org/portfolio-items/is-[email or web address]-baby-getting-enough-milk/       _______________    Good breastfeeding resources:    Websites:  www.Cardio control  [email or web address].CellEra/blog/  www.llli.org/breastfeeding-info/    Book:   The Womanly Art of Breastfeeding by [employer]    _________        Support options in the community:    [facility] [place] [address] provides a free, drop-in class/breastfeeding support group, facilitated by a lactation consultant and .  During the group you can connect with other parents, weigh your baby, ask questions about feeding and baby development, and more.  You do not need to register.  Fall in-person meetings will begin on , are for parents of babies from birth to 9 months, and will meet on Monday evenings from 6 - 7:15 pm in [facility], which is at [address] [place] [address].  [place] [address] also offers virtual group meetings with a lactation consultant/.  These take place on , from 11:30 am - 12:30 pm for parents of newborns to 3-month-olds, and from 4:15 - 5:15 for parents of 3 - 9 - month olds.  To get the meeting link contact Rosanna Lopez at [phone].    [facility] offers a free, drop-in breastfeeding support group facilitated by SARAH Mclaughlin.   at [facility] [address], [address], [place].  A scale is available to check baby weights, if desired.  [facility] also has a variety of new parent classes:  (cost for registration)  [email or web address]    Psychiatric facilitated by SARAH Hamlin:  Free, drop-in support group for breastfeeding, with baby scale available if needed.  Meets at [place]  [facility], second Tuesday of each month, 10 am - 12 pm.  Text [phone] for info.    [facility] Support Group, Tuesdays at 10:30 am   Run by SARAH Khoury of [employer] [facility] [facility]   Go to [employer] [facility] Facebook page, click on \"events\" for link:   [email or web address] [email or web address]    [facility] breastfeeding support community:  free, drop-in breastfeeding support facilitated by Certified Lactation Counselors, open Mondays and Wednesdays from 1 pm - 5 pm.  In [place]:  [facility] [place], [address] [facility]:   [email or web address]    [facility] Milk Hour, Thursdays at 2:30 pm    Run by SARAH Degroot  Go to [facility] FB page and send message to get link   [email or web address]    Shona Herman:  [email or web address]    [facility] offers a Lactation Lounge every Friday 12pm - 1pm, run by Shona Graf Leader.  Sign up via link at [email or web address]   [email or web address]    [facility] is offering virtual support groups every Monday, 10:30 am - 12 pm, run by RN IBCLC: [email or web address]      "

## 2022-09-21 NOTE — PROGRESS NOTES
"Assessment:   1.  One month old infant gaining weight well, about 1.5 oz/day, on combination of breastfeeding and expressed milk in bottles  2. Good latch and suck with fair milk transfer in office today:  Likely not representative feeding, given good weight gain.  Can continue moving towards more direct breastfeeding and less bottlefeeding  3.  Mother with good milk supply      Plan:   1.  To continue to nurse baby on cue, 8-12 times each day.  Feed on one side until baby finishes swallowing.  Once swallowing slows, use breast compression to encourage more swallowing, but once there is no more active swallowing, and baby is either sleeping, coming off the breast, or just \"nibbling,\" it is OK to use a finger to take baby off the breast and move to the other breast.  Do the same on the other side.  Offer both breasts at each feeding.    2.  Now that Stanton is gaining well, you no longer need to wake him for feedings.  It is OK for him to direct his feeding times.  3.  Stanton needs about 24-25 oz of milk each day to grow well.  He is gaining well with the amount that he is taking in now;  You can move towards substituting bottlefeeding with breastfeeding.  For example, instead of breastfeeding from 7 am to 7 pm and then bottlefeeding at night, try breastfeeding from 7 am until 10 pm.  And then after a few days until 1 am, and then 4 am, until you are directly breastfeeding all day.  You can return to your pediatric office for a weight check or consider going to a breastfeeding group where they can check weights, to be sure he is continuing to gain well as you move into this plan.  Babies gain about an ounce each day on average, or about 7 oz/week. You can certainly continue to use bottles as you want to, but the goal is that you would not feel like you need to give bottles instead of breastfeeding.  4.  Once babies are 9-10 pounds, they need about 25-30 oz/day, and this where their needs stay for their entire first " year;  so you don't need to keep producing more and more milk as they grow.  5.  You can also experiment with the laid-back or sidelying positions as other comfortable options for feeding.  6.  See pediatric provider as planned, and lactation as as needed.  [employer] can be used for brief questions, but it's important to know that messages are not seen Friday through . If urgent help is needed, Monday through Friday you can call [phone] and one of our lactation consultants will get the message and respond; if you need a rapid response over a weekend or holiday, it is best to call your on-call maternity or pediatric provider.  Please feel free to schedule a return visit if the concern is more detailed;  telephone visits are also an option if you don't feel you need to be seen in person.      Subjective: Lauren is here today for a follow up visit.  She was seen two weeks ago to have baby Stanton's milk transfer evaluated, as he had some initial slow weight gain (not returned to birthweight at 2 weeks).  At that visit Stanton had good milk transfer, and decreasing supplementation was discussed.  Today Lauren reports that baby has been gaining well-- she is breastfeeding through the day, and baby is receiving bottles of pumped milk at night given by her , with just occasional supplementation in the evenings if Stanton is fussy.  Starting to consider moving towards more direct breastfeeding at night.  She does note that her left side is more productive; also has some nipple tenderness, primarily at the beginning of feedings resolved by about 30 sec after feedings.    Lauren is vaccinated for Covid-19 and has had the disease.    Hospital Course:  due to history of myomectomy;  Birth complicated by PPH of 1000 ml.  Seen by hospital IBCLC for difficult latch (after initial good latch)--educated, support given and provided with nipple shield.    Mother's Relevant Med/Surg History: Covid in pregnancy,  "myomectomy, seizure disorder on Keppra, thrombocytopenia, anxiety    Breast Surgery: none    Breastfeeding Goals: continued breastfeeding    Previous Breastfeeding Experience: none, first baby    Infant's name: Stanton  Infant's bday: 22  Gestational age: 41w1d  Infant's birth weight: 8 # 0 oz  Discharge weight: 7 # 6.9 oz  Recent weights:  22:  7 # 10 oz   22:  7 # 14.4 oz  22:  9 # 6 oz    Mode of delivery:   Pediatric Provider: Maxime Putnam.  Lauren gives her permission for today's note to be forwarded to Dr. Dobbins.  CHIRAG signed and filed in Lauren's chart as Stanton has no local active pediatric chart.    Frequency and duration of feedings: every 2-3 hours, for about 20 min/side   Swallows audible per mother: yes  Numbers of feedings in 24 hours: 8-10  Number urines per day: 8 - 10  Number of stools per day and their color: 1 large     Supplementation:  2 - 3 times/day, usually at nighttime (at least 3 times at night) with about 2 oz  Pumping: 3 - 5 times/day, yielding  2 - 4 oz using Zomee pump    Objective/Physical exam:   Mother: Noticed breasts grew larger and areolas darkened during pregnancy and she noticed primary engorgement when her milk came in on day 4  Her nipples are everted, the areola is compressible, the breast is soft and full.     Sore nipples: occasional \"stinging\" feeling  EPDS: not completed today    Assessment of infant: 28.27% Weight for age percentile   Age today: 2 1/2 weeks  Today's weight:  9 # 3.8 oz  Amount of milk transferred from LEFT side: 0.4 oz    Amount of milk transferred from RIGHT side: 1.8 oz     Baby has full flexion of arms and legs, normal tone, behavior is alert and active, respirations are normal, skin is normal, hydration is normal, jaw is normal size and alignment, palate is normal, frenulum is normal, baby can lateralize tongue, has adequate tongue lift, and tongue can protrude past bottom gum line. Upper labial frenulum is " normal.    Suck exam:  Baby has strong, coordinated suck with good tongue cupping around examining finger    Baby thrush: none   Jaundice: none     Feeding assessment: Baby can hold suction with tongue while at the breast     Alignment: The baby was flex relaxed. Baby's head was aligned with its trunk. Baby did face mother. Baby was in cross-cradle and laid-back positions today.     Areolar Grasp: Baby was able to open mouth widely. Baby's lips were not pursed. Baby's lips did flange outward. Tongue was visible over bottom gum. Baby had complete seal.     Areolar Compression: Baby made rhythmic motion. There were no clicking or smacking sounds. There was no severe nipple discomfort. Nipples appeared rounded after feeding.    Audible swallowing: Baby made quiet sounds of swallowing: There was an increase in frequency after milk ejection reflex. The milk ejection reflex is normal and milk supply is normal.     /66 (BP Location: Right arm, Patient Position: Sitting, Cuff Size: Adult Large)   OB History    Para Term  AB Living   1 1 1 0 0 1   SAB IAB Ectopic Multiple Live Births   0 0 0 0 1      # Outcome Date GA Lbr [place]/2nd Weight Sex Delivery Anes PTL Lv   1 Term 22 41w1d  3.629 kg (8 lb) M CS-LTranv Gen N LATISHA      Name: YANIV GONZALES      Apgar1: 10  Apgar5: 10       Current Outpatient Medications:      folic acid (FOLVITE) 1 MG tablet, , Disp: , Rfl:      ibuprofen (ADVIL/MOTRIN) 600 MG tablet, Take 1 tablet (600 mg) by mouth every 6 hours as needed for moderate pain, Disp: 10 tablet, Rfl: 0     levETIRAcetam (KEPPRA) 750 MG tablet, 750 mg 2 times daily , Disp: , Rfl:      loratadine (CLARITIN) 10 MG tablet, Take 10 mg by mouth daily, Disp: , Rfl:      Prenatal Vit-Fe Fumarate-FA (PRENATAL MULTIVITAMIN W/IRON) 27-0.8 MG tablet, Take 1 tablet by mouth daily, Disp: , Rfl:   Past Medical History:   Diagnosis Date     Motion sickness      PONV (postoperative nausea and vomiting)       Seizures (H)     Epilepsy  Last      Past Surgical History:   Procedure Laterality Date      SECTION N/A 2022    Procedure:  SECTION;  Surgeon: Yo Lindquist MD;  Location: [facility]     DILATION AND CURETTAGE, OPERATIVE HYSTEROSCOPY, COMBINED N/A 2021    Procedure: HYSTEROSCOPY, REMOVAL OF INTRAUTERINE DEVICE;  Surgeon: Yo Lindquist MD;  Location: [facility] OR     UTERINE FIBROID SURGERY       No family history on file.    Time spent:  Chart review/prechartin min prior to day of service  Face-to-face visit:   49 min   Documentation:  9 min  Total time spent on day of service: 58 min    Tereza Angulo, CHERIE, LEDA, IBCLC

## 2022-09-27 ENCOUNTER — ALLIED HEALTH/NURSE VISIT (OUTPATIENT)
Dept: MIDWIFE SERVICES | Facility: CLINIC | Age: 32
End: 2022-09-27
Payer: COMMERCIAL

## 2022-09-27 VITALS — SYSTOLIC BLOOD PRESSURE: 114 MMHG | DIASTOLIC BLOOD PRESSURE: 66 MMHG

## 2022-09-27 DIAGNOSIS — O92.79 OTHER DISORDERS OF LACTATION: Primary | ICD-10-CM

## 2022-09-27 PROCEDURE — 99215 OFFICE O/P EST HI 40 MIN: CPT | Performed by: ADVANCED PRACTICE MIDWIFE

## 2022-09-27 NOTE — PATIENT INSTRUCTIONS
"  1.  To continue to nurse baby on cue, 8-12 times each day.  Feed on one side until baby finishes swallowing.  Once swallowing slows, use breast compression to encourage more swallowing, but once there is no more active swallowing, and baby is either sleeping, coming off the breast, or just \"nibbling,\" it is OK to use a finger to take baby off the breast and move to the other breast.  Do the same on the other side.  Offer both breasts at each feeding.    2.  Now that Stanton is gaining well, you no longer need to wake him for feedings.  It is OK for him to direct his feeding times.  3.  Stanton needs about 24-25 oz of milk each day to grow well.  He is gaining well with the amount that he is taking in now;  You can move towards substituting bottlefeeding with breastfeeding.  For example, instead of breastfeeding from 7 am to 7 pm and then bottlefeeding at night, try breastfeeding from 7 am until 10 pm.  And then after a few days until 1 am, and then 4 am, until you are directly breastfeeding all day.  You can return to your pediatric office for a weight check or consider going to a breastfeeding group where they can check weights, to be sure he is continuing to gain well as you move into this plan.  Babies gain about an ounce each day on average, or about 7 oz/week. You can certainly continue to use bottles as you want to, but the goal is that you would not feel like you need to give bottles instead of breastfeeding.  4.  Once babies are 9-10 pounds, they need about 25-30 oz/day, and this where their needs stay for their entire first year;  so you don't need to keep producing more and more milk as they grow.  5.  You can also experiment with the laid-back or sidelying positions as other comfortable options for feeding.  6.  See pediatric provider as planned, and lactation as as needed.  [employer] can be used for brief questions, but it's important to know that messages are not seen Friday through Sunday. If urgent " help is needed, Monday through Friday you can call [phone] and one of our lactation consultants will get the message and respond; if you need a rapid response over a weekend or holiday, it is best to call your on-call maternity or pediatric provider.  Please feel free to schedule a return visit if the concern is more detailed;  telephone visits are also an option if you don't feel you need to be seen in person.    _____________________        For good videos on the laid-back breastfeeding position:  [email or web address].[email or web address]  Www.Excep Apps.PollVaultr       __________  For a good video showing sidelying breastfeeding:   Https://www.youImcompanyube.com/watch?v=YZK4rmyDxgX       ______________        Support options in the community with weight checks:    Early Child[facility] [place] [address] provides a free, drop-in class/breastfeeding support group, facilitated by a lactation consultant and .  During the group you can connect with other parents, weigh your baby, ask questions about feeding and baby development, and more.  You do not need to register.  Fall in-person meetings will begin on , are for parents of babies from birth to 9 months, and will meet on Monday evenings from 6 - 7:15 pm in [facility], which is at [address] [place] [address].  [place] [address] also offers virtual group meetings with a lactation consultant/.  These take place on , from 11:30 am - 12:30 pm for parents of newborns to 3-month-olds, and from 4:15 - 5:15 for parents of 3 - 9 - month olds.  To get the meeting link contact Rosanna Lopez at [phone].    [facility] offers a free, drop-in breastfeeding support group facilitated by SARAH Mclaughlin.   at [facility] [address], [address], [place].  A scale is available to check baby weights, if desired.  [place] also has a variety of new parent classes:  (cost for registration)   "[email or web address]/    [facility] Lactation Lounge facilitated by ADILENE HamlinCLC:  Free, drop-in support group for breastfeeding, with baby scale available if needed.  Meets at [facility], second Tuesday of each month, 10 am - 12 pm.  Text [phone] for info.    [facility] Support Group, Tuesdays at 10:30 am   Run by SARAH Khoury of [employer] [facility]   Go to [employer] [facility] Facebook page, click on \"events\" for link:   [email or web address]/events/968911055950095/    [facility] breastfeeding support community:  free, drop-in breastfeeding support facilitated by Certified Lactation Counselors, open Mondays and Wednesdays from 1 pm - 5 pm.  In [place]:  [facility], [address] [facility]:   [email or web address]    [facility] Milk [facility], Thursdays at 2:30 pm    Run by Haseeb Norris, SARAH  Go to [facility] FB page and send message to get link   [email or web address]    Shona Herman:  [email or web address]    [facility] offers a Lactation Lounge every Friday 12pm - 1pm, run by Shona Graf Leader.  Sign up via link at [email or web address]-lactation   [email or web address]    [facility] is offering virtual support groups every Monday, 10:30 am - 12 pm, run by RN IBCLC: Https://www.[email or web address]/events/948118961285096/  "

## 2022-10-01 ENCOUNTER — HEALTH MAINTENANCE LETTER (OUTPATIENT)
Age: 32
End: 2022-10-01

## 2022-10-07 ENCOUNTER — LAB REQUISITION (OUTPATIENT)
Dept: LAB | Facility: CLINIC | Age: 32
End: 2022-10-07

## 2022-10-07 DIAGNOSIS — G40.909 EPILEPSY, UNSPECIFIED, NOT INTRACTABLE, WITHOUT STATUS EPILEPTICUS (H): ICD-10-CM

## 2022-10-07 PROCEDURE — 80177 DRUG SCRN QUAN LEVETIRACETAM: CPT | Performed by: OBSTETRICS & GYNECOLOGY

## 2022-10-08 LAB — LEVETIRACETAM SERPL-MCNC: 36.5 ΜG/ML (ref 10–40)

## 2022-10-21 ENCOUNTER — TRANSFERRED RECORDS (OUTPATIENT)
Dept: HEALTH INFORMATION MANAGEMENT | Facility: CLINIC | Age: 32
End: 2022-10-21

## 2023-10-15 ENCOUNTER — HEALTH MAINTENANCE LETTER (OUTPATIENT)
Age: 33
End: 2023-10-15

## 2023-11-03 ENCOUNTER — TRANSFERRED RECORDS (OUTPATIENT)
Dept: HEALTH INFORMATION MANAGEMENT | Facility: CLINIC | Age: 33
End: 2023-11-03

## 2023-11-03 ENCOUNTER — LAB REQUISITION (OUTPATIENT)
Dept: LAB | Facility: CLINIC | Age: 33
End: 2023-11-03

## 2023-11-03 DIAGNOSIS — N84.1 POLYP OF CERVIX UTERI: ICD-10-CM

## 2023-11-03 PROCEDURE — 88305 TISSUE EXAM BY PATHOLOGIST: CPT | Performed by: STUDENT IN AN ORGANIZED HEALTH CARE EDUCATION/TRAINING PROGRAM

## 2023-11-07 LAB
PATH REPORT.COMMENTS IMP SPEC: NORMAL
PATH REPORT.COMMENTS IMP SPEC: NORMAL
PATH REPORT.FINAL DX SPEC: NORMAL
PATH REPORT.GROSS SPEC: NORMAL
PATH REPORT.MICROSCOPIC SPEC OTHER STN: NORMAL
PATH REPORT.RELEVANT HX SPEC: NORMAL
PHOTO IMAGE: NORMAL

## 2024-04-24 NOTE — ANESTHESIA CARE TRANSFER NOTE
Patient: Lauren Montague    Procedure(s):  HYSTEROSCOPY, REMOVAL OF INTRAUTERINE DEVICE    Diagnosis: Complication of intrauterine device (H) [T83.9XXA]  Diagnosis Additional Information: No value filed.    Anesthesia Type:   MAC     Note:    Oropharynx: oropharynx clear of all foreign objects  Level of Consciousness: awake  Oxygen Supplementation: room air    Independent Airway: airway patency satisfactory and stable  Dentition: dentition unchanged  Vital Signs Stable: post-procedure vital signs reviewed and stable  Report to RN Given: handoff report given  Patient transferred to: Phase II    Handoff Report: Identifed the Patient, Identified the Reponsible Provider, Reviewed the pertinent medical history, Discussed the surgical course, Reviewed Intra-OP anesthesia mangement and issues during anesthesia, Set expectations for post-procedure period and Allowed opportunity for questions and acknowledgement of understanding      Vitals:  Vitals Value Taken Time   BP 92/42 08/18/21 0901   Temp 97.6  F (36.4  C) 08/18/21 0901   Pulse 86 08/18/21 0901   Resp 16 08/18/21 0901   SpO2 97 % 08/18/21 0901       Electronically Signed By: CHERIE Chase CRNA  August 18, 2021  9:05 AM   Yes

## 2024-09-27 ENCOUNTER — LAB (OUTPATIENT)
Dept: LAB | Facility: CLINIC | Age: 34
End: 2024-09-27
Payer: COMMERCIAL

## 2024-09-27 DIAGNOSIS — R56.9 CONVULSIONS (H): ICD-10-CM

## 2024-09-27 PROCEDURE — 36415 COLL VENOUS BLD VENIPUNCTURE: CPT

## 2024-09-27 PROCEDURE — 80177 DRUG SCRN QUAN LEVETIRACETAM: CPT

## 2024-09-28 LAB — LEVETIRACETAM SERPL-MCNC: 8 ΜG/ML (ref 10–40)

## 2024-10-03 ENCOUNTER — VIRTUAL VISIT (OUTPATIENT)
Dept: OBGYN | Facility: CLINIC | Age: 34
End: 2024-10-03
Payer: COMMERCIAL

## 2024-10-03 DIAGNOSIS — O09.529 ENCOUNTER FOR SUPERVISION OF MULTIGRAVIDA WITH ADVANCED MATERNAL AGE: Primary | ICD-10-CM

## 2024-10-03 DIAGNOSIS — Z23 NEED FOR DIPHTHERIA-TETANUS-PERTUSSIS (TDAP) VACCINE: ICD-10-CM

## 2024-10-03 PROBLEM — G40.909 EPILEPSY (H): Status: ACTIVE | Noted: 2024-10-03

## 2024-10-03 PROBLEM — Z36.89 ENCOUNTER FOR TRIAGE IN PREGNANT PATIENT: Status: RESOLVED | Noted: 2022-08-25 | Resolved: 2024-10-03

## 2024-10-03 PROBLEM — F32.9 MAJOR DEPRESSIVE DISORDER: Status: ACTIVE | Noted: 2023-10-10

## 2024-10-03 PROBLEM — Z34.90 ENCOUNTER FOR ELECTIVE INDUCTION OF LABOR: Status: RESOLVED | Noted: 2022-08-25 | Resolved: 2024-10-03

## 2024-10-03 PROCEDURE — 99207 PR NO CHARGE NURSE ONLY: CPT | Mod: 93

## 2024-10-03 RX ORDER — ALBUTEROL SULFATE 90 UG/1
INHALANT RESPIRATORY (INHALATION)
COMMUNITY
Start: 2024-01-19 | End: 2024-10-03

## 2024-10-03 RX ORDER — CLINDAMYCIN HYDROCHLORIDE 300 MG/1
CAPSULE ORAL
COMMUNITY
End: 2024-10-03

## 2024-10-03 NOTE — PROGRESS NOTES
Important Information for Provider:     New ob nurse intake by phone, second pregnancy.  Recent C section 8/26/2022  Handouts reviewed. Ordered  the genetic screening  Patient is taking Keppra for epilepsy , recently had her labs drawn, her dose will be changing 1000 mg twice daily( provider has not ordered the different dose yet)  Recommended  B6, Unisom for nausea   Ultrasound and NOB with Dr Crawford 8/16/2024  History of thrombocytopenia    Caffeine intake/servings daily - 0  Calcium intake/servings daily - 3  Exercise 5 times weekly - describe ; walks, runs, precautions given  Sunscreen used - Yes  Seatbelts used - Yes  Guns stored in the home - No  Self Breast Exam - Yes  Pap test up to date -  Yes  Dental exam up to date -  Yes  Immunizations reviewed and up to date - Yes  Abuse: Current or Past (Physical, Sexual or Emotional) - No  Do you feel safe in your environment - Yes  Do you cope well with stress - Yes      Prenatal OB Questionnaire  Patient supplied answers from flow sheet for:  Prenatal OB Questionnaire.  Past Medical History  Have you ever recieved care for your mental health? : (!) Yes  Have you ever been in a major accident or suffered serious trauma?: (!) Yes  Within the last year, has anyone hit, slapped, kicked or otherwise hurt you?: No  In the last year, has anyone forced you to have sex when you didn't want to?: No    Past Medical History 2   Have you ever received a blood transfusion?: No  Would you accept a blood transfusion if was medically recommended?: Yes  Does anyone in your home smoke?: No   Is your blood type Rh negative?: Unknown  Have you ever ?: (!) Yes  Have you been hospitalized for a nonsurgical reason excluding normal delivery?: No  Have you ever had an abnormal pap smear?: (!) Yes    Past Medical History (Continued)  Do you have a history of abnormalities of the uterus?: (!) Yes  Did your mother take JAVAN or any other hormones when she was pregnant with you?: No  Do  you have any other problems we have not asked about which you feel may be important to this pregnancy?: (!) Yes                     Allergies as of 10/3/2024:    Allergies as of 10/03/2024 - Reviewed 10/03/2024   Allergen Reaction Noted    Morphine Nausea and Vomiting 08/16/2021    Penicillins Swelling and Other (See Comments) 03/10/2004    Dust mites Other (See Comments) 10/03/2024    Cat hair extract Unknown, Itching, and Rash 07/17/2013    Pollen extract Itching and Rash 02/28/2023               Early ultrasound screening tool:    Does patient have irregular periods?  No  Did patient use hormonal birth control in the three months prior to positive urine pregnancy test? No  Is the patient breastfeeding?  No  Is the patient 10 weeks or greater at time of education visit?  No

## 2024-10-04 ENCOUNTER — TRANSCRIBE ORDERS (OUTPATIENT)
Dept: MATERNAL FETAL MEDICINE | Facility: CLINIC | Age: 34
End: 2024-10-04
Payer: COMMERCIAL

## 2024-10-04 DIAGNOSIS — O26.90 PREGNANCY RELATED CONDITION, ANTEPARTUM: Primary | ICD-10-CM

## 2024-10-04 PROBLEM — Z23 NEED FOR DIPHTHERIA-TETANUS-PERTUSSIS (TDAP) VACCINE: Status: ACTIVE | Noted: 2024-10-04

## 2024-10-09 LAB
ABO/RH(D): NORMAL
ANTIBODY SCREEN: NEGATIVE
SPECIMEN EXPIRATION DATE: NORMAL

## 2024-10-10 ENCOUNTER — LAB (OUTPATIENT)
Dept: LAB | Facility: CLINIC | Age: 34
End: 2024-10-10
Payer: COMMERCIAL

## 2024-10-10 DIAGNOSIS — O09.529 ENCOUNTER FOR SUPERVISION OF MULTIGRAVIDA WITH ADVANCED MATERNAL AGE: ICD-10-CM

## 2024-10-10 LAB
ALBUMIN UR-MCNC: NEGATIVE MG/DL
APPEARANCE UR: CLEAR
BILIRUB UR QL STRIP: NEGATIVE
COLOR UR AUTO: YELLOW
ERYTHROCYTE [DISTWIDTH] IN BLOOD BY AUTOMATED COUNT: 12.7 % (ref 10–15)
GLUCOSE UR STRIP-MCNC: NEGATIVE MG/DL
HCT VFR BLD AUTO: 43.2 % (ref 35–47)
HGB BLD-MCNC: 14.6 G/DL (ref 11.7–15.7)
HGB UR QL STRIP: NEGATIVE
KETONES UR STRIP-MCNC: NEGATIVE MG/DL
LEUKOCYTE ESTERASE UR QL STRIP: NEGATIVE
MCH RBC QN AUTO: 28.7 PG (ref 26.5–33)
MCHC RBC AUTO-ENTMCNC: 33.8 G/DL (ref 31.5–36.5)
MCV RBC AUTO: 85 FL (ref 78–100)
NITRATE UR QL: NEGATIVE
PH UR STRIP: 5.5 [PH] (ref 5–7)
PLATELET # BLD AUTO: 125 10E3/UL (ref 150–450)
RBC # BLD AUTO: 5.09 10E6/UL (ref 3.8–5.2)
SP GR UR STRIP: >=1.03 (ref 1–1.03)
UROBILINOGEN UR STRIP-ACNC: 0.2 E.U./DL
WBC # BLD AUTO: 10.2 10E3/UL (ref 4–11)

## 2024-10-10 PROCEDURE — 86850 RBC ANTIBODY SCREEN: CPT

## 2024-10-10 PROCEDURE — 81003 URINALYSIS AUTO W/O SCOPE: CPT

## 2024-10-10 PROCEDURE — 87086 URINE CULTURE/COLONY COUNT: CPT

## 2024-10-10 PROCEDURE — 87389 HIV-1 AG W/HIV-1&-2 AB AG IA: CPT

## 2024-10-10 PROCEDURE — 86780 TREPONEMA PALLIDUM: CPT

## 2024-10-10 PROCEDURE — 86900 BLOOD TYPING SEROLOGIC ABO: CPT

## 2024-10-10 PROCEDURE — 86762 RUBELLA ANTIBODY: CPT

## 2024-10-10 PROCEDURE — 86803 HEPATITIS C AB TEST: CPT

## 2024-10-10 PROCEDURE — 36415 COLL VENOUS BLD VENIPUNCTURE: CPT

## 2024-10-10 PROCEDURE — 85027 COMPLETE CBC AUTOMATED: CPT

## 2024-10-10 PROCEDURE — 87340 HEPATITIS B SURFACE AG IA: CPT

## 2024-10-10 PROCEDURE — 86901 BLOOD TYPING SEROLOGIC RH(D): CPT

## 2024-10-11 LAB
BACTERIA UR CULT: NORMAL
HBV SURFACE AG SERPL QL IA: NONREACTIVE
HCV AB SERPL QL IA: NONREACTIVE
HIV 1+2 AB+HIV1 P24 AG SERPL QL IA: NONREACTIVE
RUBV IGG SERPL QL IA: 1.41 INDEX
RUBV IGG SERPL QL IA: POSITIVE
T PALLIDUM AB SER QL: NONREACTIVE

## 2024-10-18 ENCOUNTER — IMMUNIZATION (OUTPATIENT)
Dept: FAMILY MEDICINE | Facility: CLINIC | Age: 34
End: 2024-10-18
Payer: COMMERCIAL

## 2024-10-18 PROCEDURE — 90656 IIV3 VACC NO PRSV 0.5 ML IM: CPT

## 2024-10-18 PROCEDURE — 90480 ADMN SARSCOV2 VAC 1/ONLY CMP: CPT

## 2024-10-18 PROCEDURE — 91320 SARSCV2 VAC 30MCG TRS-SUC IM: CPT

## 2024-10-18 PROCEDURE — 90471 IMMUNIZATION ADMIN: CPT

## 2024-10-31 ENCOUNTER — PRE VISIT (OUTPATIENT)
Dept: MATERNAL FETAL MEDICINE | Facility: HOSPITAL | Age: 34
End: 2024-10-31
Payer: COMMERCIAL

## 2024-11-02 ENCOUNTER — LAB (OUTPATIENT)
Dept: LAB | Facility: CLINIC | Age: 34
End: 2024-11-02
Payer: COMMERCIAL

## 2024-11-02 DIAGNOSIS — R56.9 CONVULSIONS (H): ICD-10-CM

## 2024-11-02 LAB — LEVETIRACETAM SERPL-MCNC: 22 ÂΜG/ML (ref 10–40)

## 2024-11-02 PROCEDURE — 80177 DRUG SCRN QUAN LEVETIRACETAM: CPT

## 2024-11-02 PROCEDURE — 36415 COLL VENOUS BLD VENIPUNCTURE: CPT

## 2024-11-05 ENCOUNTER — ANCILLARY PROCEDURE (OUTPATIENT)
Dept: ULTRASOUND IMAGING | Facility: HOSPITAL | Age: 34
End: 2024-11-05
Attending: STUDENT IN AN ORGANIZED HEALTH CARE EDUCATION/TRAINING PROGRAM
Payer: COMMERCIAL

## 2024-11-05 ENCOUNTER — OFFICE VISIT (OUTPATIENT)
Dept: MATERNAL FETAL MEDICINE | Facility: HOSPITAL | Age: 34
End: 2024-11-05
Attending: STUDENT IN AN ORGANIZED HEALTH CARE EDUCATION/TRAINING PROGRAM
Payer: COMMERCIAL

## 2024-11-05 DIAGNOSIS — Z31.5 ENCOUNTER FOR PROCREATIVE GENETIC COUNSELING AND TESTING: ICD-10-CM

## 2024-11-05 DIAGNOSIS — O26.90 PREGNANCY RELATED CONDITION, ANTEPARTUM: ICD-10-CM

## 2024-11-05 DIAGNOSIS — O09.521 MULTIGRAVIDA OF ADVANCED MATERNAL AGE IN FIRST TRIMESTER: Primary | ICD-10-CM

## 2024-11-05 DIAGNOSIS — Z83.2 FAMILY HISTORY OF THROMBOCYTOPENIA: ICD-10-CM

## 2024-11-05 DIAGNOSIS — O09.521 SUPERVISION OF ELDERLY MULTIGRAVIDA IN FIRST TRIMESTER: ICD-10-CM

## 2024-11-05 DIAGNOSIS — Z31.5 ENCOUNTER FOR PROCREATIVE GENETIC COUNSELING AND TESTING: Primary | ICD-10-CM

## 2024-11-05 PROCEDURE — 36415 COLL VENOUS BLD VENIPUNCTURE: CPT | Performed by: STUDENT IN AN ORGANIZED HEALTH CARE EDUCATION/TRAINING PROGRAM

## 2024-11-05 PROCEDURE — 99207 PR NO CHARGE LOS: CPT | Performed by: STUDENT IN AN ORGANIZED HEALTH CARE EDUCATION/TRAINING PROGRAM

## 2024-11-05 PROCEDURE — 96040 HC GENETIC COUNSELING, EACH 30 MINUTES: CPT | Performed by: GENETIC COUNSELOR, MS

## 2024-11-05 PROCEDURE — 76813 OB US NUCHAL MEAS 1 GEST: CPT | Mod: 26 | Performed by: STUDENT IN AN ORGANIZED HEALTH CARE EDUCATION/TRAINING PROGRAM

## 2024-11-05 PROCEDURE — 76813 OB US NUCHAL MEAS 1 GEST: CPT

## 2024-11-05 NOTE — NURSING NOTE
Lauren Montague is a  at 11w4d who presents to [facility] for an NT ultrasound. Pt reports positive fetal movement. Pt denies bldg/lof/change in discharge, contractions, headache, vision changes, chest pain/SOB or edema. SBAR given to Dr. Arrington, see note in Epic.       Tanisha Hicks RN

## 2024-11-05 NOTE — PROGRESS NOTES
The patient was seen for an [facility] in [facility] today.  For a detailed report of the ultrasound examination, please see the ultrasound report which can be found under the imaging tab.    If you have questions regarding today's evaluation or if we can be of further service, please contact the [facility].    Gabby Arrington M.D.  Maternal Fetal-Medicine Specialist

## 2024-11-05 NOTE — PROGRESS NOTES
[facility]  Genetic Counseling Consult    Patient:  Lauren Montague  Preferred Name: Lauren YOB: 1990   Date of Service:  24   MRN: [id number]    Lauren was seen at the [facility] for genetic consultation. The indication for genetic counseling is advanced maternal age. The patient was accompanied to this visit by their  Max.    The session was conducted in English.      IMPRESSION/ PLAN   1. Lauren has not had genetic screening in this pregnancy but elected to have screening today.     2. During today's [facility] visit, Lauren had a blood draw for expanded non-invasive prenatal testing (also called NIPT, NIPS, or cell-free DNA) through [employer] ([employer]). The expanded NIPT screens for trisomy 21, 18, and 13 and select microdeletion syndromes, including 22q11.2 deletion syndrome. The patient opted to screen for sex chromosome aneuploidies, including reported fetal sex. Results are expected in 7-14 days. The patient will be called with results and if they do not answer they requested a detailed message with results on their voicemail, including the predicted fetal sex information.  Patient was informed that results, including fetal sex, will be available in [facility].    3. Since the patient chose aneuploidy screening via NIPT, quad screen is NOT recommended in the second trimester. Lauren would like to have screening for open neural tube defects due to her keppra use in pregnancy, and a maternal serum AFP only is recommended, ideally between 16-18 weeks gestation.    4. Lauren had a nuchal translucency ultrasound today. Please see the ultrasound report for further details.    5. Further recommendations include a fetal anatomy level II ultrasound with [facility]. The appointment(s) has not been scheduled yet. M schedulers will reach out or the patient can call [phone].     PREGNANCY HISTORY   /Parity:  "      Lauren's pregnancy history is significant for:   1 prior full term pregnancy with no reported obstetric complications    CURRENT PREGNANCY   Current Age: 34 year old   Age at Delivery: 34 year old  EYAD: 2025, by Last Menstrual Period                                   Gestational Age: 11w4d  This pregnancy is a single gestation.   This pregnancy was conceived spontaneously.  Lauren reports the following complications and/or exposure concerns in this pregnancy: NONE    MEDICAL HISTORY   Lauren s reported medical history includes epilepsy without an identified cause and chronic thrombocytopenia. Lauren is followed closely by a neurologist for her epilepsy and that provider group is aware of the pregnancy and managing her keppra doseage at therapeutic levels per patient report.  We discussed that some anti-seizure medications have been associated with increased risk for neural tube defects in pregnancy, and Lauren reports taking prenatal vitamins and folic acid supplementation prior to conception and throughout pregnancy thus far.  We discussed the availability of screening for ONTD by maternal serum AFP in the second trimester, as well as a comprehensive, level II anatomy scan with MFM at 18-20 weeks.        FAMILY HISTORY   A three-generation pedigree was obtained today and is scanned under the \"Media\" tab in Epic. The family history was reported by Lauren and their partner.    The following significant findings were reported today:   Lauren, her son, brother, father, paternal aunt, and paternal cousin all have chronic benign thrombocytopenia of unknown etiology.  We discussed a presumed dominant inheritance based on this family history and a 50% risk for pregnancy.  Without a specific genetic cause identified for an affected member of the family genetic testing for the pregnancy is not possible, but discussion with pediatrician early after delivery is recommended.   Max and his paternal " grandmother both had unilateral polydactyly.  We discussed that the presence of an extra finger/toe is commonly a hereditary condition with dominant inheritance but variable pentrance, meaning not all members of a family with the genetic change will be affected.  This condition is often isolated and his family history is not suggestive of an underlying complex genetic syndrome with other health risks.  We discussed the utility and limitations of ultrasound for fingers/toes, and a level II ultrasound with MFM is recommended.   Lauren has a maternal first cousin with cognitive impairment of unknown etiology.  This cousin is in her 50's and has an unaffected sister.  We discussed the limitations in genetic risk assessment without a known diagnosis for this cousin, and while risks cannot be ruled out, the isolated nature of this history within the broader family indicates risks for Lauren may be low.  Options for carrier screening for genetic disorders was discussed and declined for the time being.    Lauren reports both her parents were diagnosed with colon polyps at younger ages and she plans to initiate cancer screening at 40.      Otherwise, the reported family history is unremarkable for multiple miscarriages, stillbirths, birth defects, intellectual disabilities, known genetic conditions, and consanguinity.       RISK ASSESSMENT FOR INHERITED CONDITIONS AND CARRIER SCREENING OPTIONS   Expanded carrier screening is available to screen for autosomal recessive conditions and X-linked conditions in a large list of genes. Carrier screening does not test the pregnancy but gives a risk assessment for the pregnancy and future pregnancies to have the condition. Expanded carrier screening is designed to identify carrier status for conditions that are primarily childhood or adolescent onset. Expanded carrier screening does not evaluate for adult-onset conditions such as hereditary cancer syndromes, dementia/ Alzheimer's  disease, or cardiovascular disease risk factors. Additionally, expanded carrier screening is not comprehensive for all known genetic diseases or inherited conditions. Carrier screening does not test for all genetic and health conditions or risk factors.     Autosomal recessive conditions happen when a mutation has been inherited from the egg and sperm and include conditions like cystic fibrosis, thalassemia, hearing loss, spinal muscular atrophy, and more. We reviewed that when both biological parents carry a harmful genetic change in a gene associated with autosomal recessive inheritance, each of their pregnancies has a 1 in 4 (25%) chance to be affected by that condition. X-linked conditions happen when a mutation has been inherited from the egg and include conditions like fragile X syndrome.With x-linked conditions, the specific risk generally depends on the chromosomal sex of the fetus, with XY individuals (generally male) being most severely affected.      screening was reviewed. About [place] Screening    The patient does NOT have a family history of known inherited conditions. This does NOT mean the patient and/or their partner is not a carrier of a condition. Approximately 90% of couples at an increased reproductive risk for an inherited condition have no family history of that condition.     The patient has not had carrier screening previously.     The patient was not certain about whether to pursue carrier screening today. They will contact us if they would like to pursue screening. See below for the more detailed information we discussed.      RISK ASSESSMENT FOR CHROMOSOME CONDITIONS   We explained that the risk for fetal chromosome abnormalities increases with maternal age. We discussed specific features of common chromosome abnormalities, including Down syndrome, trisomy 13, trisomy 18, and sex chromosome trisomies.    At age 34 at midtrimester, the risk to have a baby with Down syndrome is 1  in 216.   At age 34 at midtrimester, the risk to have a baby with any chromosome abnormality is 1 in 105.     Lauren has not had genetic screening in this pregnancy but elected to have screening today.      GENETIC TESTING OPTIONS   Genetic testing during a pregnancy includes screening and diagnostic procedures.      Screening tests are non-invasive which means no risk to the pregnancy and includes ultrasounds and blood work. The benefits and limitations of screening were reviewed. Screening tests provide a risk assessment (chance) specific to the pregnancy for certain fetal chromosome abnormalities but cannot definitively diagnose or exclude a fetal chromosome abnormality. Follow-up genetic counseling and consideration of diagnostic testing is recommended with any abnormal screening result. Diagnostic testing during a pregnancy is more certain and can test for more conditions. However, the tests do have a risk of miscarriage that requires careful consideration. These tests can detect fetal chromosome abnormalities with greater than 99% certainty. Results can be compromised by maternal cell contamination or mosaicism and are limited by the resolution of current genetic testing technology.     There is no screening or diagnostic test that detects all forms of birth defects or intellectual disability.     We discussed the following screening options:     Non-invasive prenatal testing (NIPT)  Also called cell-free DNA screening because it detects chromosomes from the placenta in the pregnant person's blood  Can be done any time after 10 weeks gestation  Standard recommendation for NIPT screens for trisomy 21, trisomy 18, trisomy 13, with the option of adding sex chromosome aneuploidies, without or without predicted sex  Cannot screen for open neural tube defects, maternal serum AFP after 15 weeks is recommended  New NIPT options include screening for other trisomies, microdeletion syndromes, and in some cases fetal  blood antigens. Guidelines do not recommend these conditions are included in standard screening. These options have limitations and should be discussed with a genetic counselor.   However, current (2023) ACMG guidelines do recommend that screening for one microdeletion syndrome, called 22q11.2 deletion syndrome be offered to all pregnant patients. 22q11.2 deletion syndrome has an estimated prevalence of 1 in 990 to 1 in 2148 (0.05-0.1%). Risk is not thought to increase with maternal age. Clinical features are variable but include risks for congenital heart defects, cleft palate, developmental delays, immune system deficiencies, and hearing loss. Approximately 90% of cases are de mikel (a sporadic new change in a pregnancy). Cell-free DNA screening for 22q11.2 deletion syndrome is available (Expanded NIPS through [employer]). We discussed the limitations of cell-free DNA screening in detecting microdeletions and the possiblity of false positives and false negatives. Expanded NIPS also allows for reflex to include other microdeletion conditions and rare autosomal trisomies if an indication would arise later in the pregnancy. The patient opted into microdeletion syndrome screening.     AFP only  Blood work from arm for levels of AFP (alpha-fetoprotein)  Can be done between 14w1d and 23w6d gestation  Screens for open neural tube defects like spina bifida  Recommended for those that do not want genetic testing (for chromosome conditions), those who did screening other than the quad screen, and those with a personal or family history of neural tube defects.    We discussed the following ultrasound options:    Nuchal translucency (NT) ultrasound  Ultrasound between 20e8o-22g0f that includes nuchal translucency measurement and nasal bone assessments  Nuchal translucency refers to the space at the back of the neck where fluid builds up. All babies at this stage have fluid and there is only concern if there is too much  fluid  Nasal bone refers to the small bone in the nose. There is concern for conditions like Down syndrome if the bone cannot be seen at all  This ultrasound can be done as part of first trimester screening, at the same time as another screen (NIPT), at the same time as a CVS, or if the patients does not want genetic screening.  Markers on ultrasound detects about 70% of pregnancies with aneuploidy  Abnormalities on NT ultrasound can also increase the risk for a birth defect, like a heart defect    Comprehensive level II ultrasound (Fetal Anatomy Ultrasound)  Ultrasound done between 18-20 weeks gestation  Screens for major birth defects and markers for aneuploidy (like trisomy 21 and trisomy 18)  Includes looking at the fetus/baby's growth, heart, organs (stomach, kidneys), placenta, and amniotic fluid    We discussed the following diagnostic options:     Chorionic villus sampling (CVS)  Invasive diagnostic procedure done between 10w0d and 13w6d  The procedure collects a small sample from the placenta for the purpose of chromosomal testing and/or other genetic testing  Diagnostic result; more than 99% sensitivity for fetal chromosome abnormalities  Cannot screen for open neural tube defects, maternal serum AFP after 15 weeks is recommended    Amniocentesis  Invasive diagnostic procedure done after 15 weeks gestation  The procedure collects a small sample of amniotic fluid for the purpose of chromosomal testing and/or other genetic testing  Diagnostic result; more than 99% sensitivity for fetal chromosome abnormalities  Testing for AFP in the amniotic fluid can test for open neural tube defects    It was a pleasure to be involved with [facility] care. Face-to-face time of the meeting was 45 minutes.    Keny Anderson, GC, MS, [facility]  Board Certified and [place] Licensed Genetic Counselor   [facility]  Maternal Fetal Medicine  Office: [phone]  [facility]: [phone]   Fax: [phone]  [facility]  MFWIL

## 2024-11-06 ENCOUNTER — TELEPHONE (OUTPATIENT)
Dept: OBGYN | Facility: CLINIC | Age: 34
End: 2024-11-06
Payer: COMMERCIAL

## 2024-11-06 NOTE — TELEPHONE ENCOUNTER
Patient called and notified US not needed.  US cancelled.    KAREN Cruz, MD Rosalba Payan, Emelyn CANNON RN; [facility] Triage  Does not need US tomorrow if she had one yesterday.  Please ensure she is called first if US is cancelled.  Once had pt who wasn't notified and wasn't happy.    Thanks,  Natali Crawford MD

## 2024-11-06 NOTE — TELEPHONE ENCOUNTER
Health Call Center    Phone Message    May a detailed message be left on voicemail: yes     Reason for Call: Other: Pt has an ultrasound and her initial ob visit scheduled for tomorrow (11/7) and states she had an ultrasound with MFM yesterday and is wondering if the ultrasound tomorrow is needed or not. She requests a call back to let her know. Please advise.      Action Taken: Other: zahida obgyjorge a    Travel Screening: Not Applicable     Date of Service:

## 2024-11-07 ENCOUNTER — PRENATAL OFFICE VISIT (OUTPATIENT)
Dept: OBGYN | Facility: CLINIC | Age: 34
End: 2024-11-07
Payer: COMMERCIAL

## 2024-11-07 VITALS
OXYGEN SATURATION: 91 % | DIASTOLIC BLOOD PRESSURE: 82 MMHG | SYSTOLIC BLOOD PRESSURE: 116 MMHG | HEIGHT: 63 IN | TEMPERATURE: 97.5 F | BODY MASS INDEX: 38.79 KG/M2 | WEIGHT: 218.9 LBS | HEART RATE: 77 BPM

## 2024-11-07 DIAGNOSIS — O09.529 ENCOUNTER FOR SUPERVISION OF MULTIGRAVIDA WITH ADVANCED MATERNAL AGE: Primary | ICD-10-CM

## 2024-11-07 DIAGNOSIS — D69.6 THROMBOCYTOPENIA (H): ICD-10-CM

## 2024-11-07 DIAGNOSIS — Z98.890 HISTORY OF MYOMECTOMY: ICD-10-CM

## 2024-11-07 DIAGNOSIS — O34.219 PREVIOUS CESAREAN DELIVERY, ANTEPARTUM CONDITION OR COMPLICATION: ICD-10-CM

## 2024-11-07 DIAGNOSIS — G40.909 NONINTRACTABLE EPILEPSY WITHOUT STATUS EPILEPTICUS, UNSPECIFIED EPILEPSY TYPE (H): ICD-10-CM

## 2024-11-07 PROCEDURE — G2211 COMPLEX E/M VISIT ADD ON: HCPCS | Performed by: OBSTETRICS & GYNECOLOGY

## 2024-11-07 PROCEDURE — 99213 OFFICE O/P EST LOW 20 MIN: CPT | Performed by: OBSTETRICS & GYNECOLOGY

## 2024-11-07 RX ORDER — LEVETIRACETAM 1000 MG/1
TABLET ORAL
COMMUNITY
Start: 2024-10-06

## 2024-11-07 ASSESSMENT — PATIENT HEALTH QUESTIONNAIRE - PHQ9: SUM OF ALL RESPONSES TO PHQ QUESTIONS 1-9: 3

## 2024-11-07 NOTE — PROGRESS NOTES
"OB - New OB History and Physical    HPI: Gael Montague is a 34 year old  at 11w6d as dated by LMP c/w 11w US.   Estimated Date of Delivery: May 23, 2025.    This was a planned pregnancy.  Had been trying through summer.    Since becoming pregnant, patient reports she's been feeling relatively well.  Early pregnancy symptoms largely resolved.  Some HA in afternoon.    Hx c/s 22 due to hx myomectomy.  Ended up being performed at 41w1d.  Had come in for IOL and on call MD reviewed hx and noted this.  Let her know labor was contraindicated and she had her PLTCS by her primary ob/gyn the following day.  Done under GET due to thrombocytopenia.  OP note:  Of note there was t a slight thinning with a prior myomectomy scar was. There was also another 5 cm fibroid noted that was intramural in the fundus.      Platelets 50s-70s at end of pregnancy.  \"Chronically low platelets.\"  Dad, brother, son, and paternal cousins with low platelets.  Seems to be worse in males.  No specific diagnosis for anyone.    Hx myomectomy in .  Large fundal fibroid.    On Keppra for epilepsy.  Also on folic acid.  Doc at [facility].  Diagnosed in .  No seizures since .    Ultrasound: NT yesterday normal.  AGNES vs placental lake?    Obstetric history:     OB History    Para Term  AB Living   2 1 1 0 0 1   SAB IAB Ectopic Multiple Live Births   0 0 0 0 1      # Outcome Date GA Lbr [place]/2nd Weight Sex Type Anes PTL Lv   2 Current            1 Term 22 41w1d  3.629 kg (8 lb) M CS-LTranv Gen N LATISHA      Name: JANETMALE-GAEL      Apgar1: 10  Apgar5: 10     Patient denies history of gestational hypertension, pre-eclampsia, gestational diabetes,  labor.    Gynecologic History:   Menstrual Interval: regular  Patient's last menstrual period was 2024.   STI history: neg  Last Pap: She thinks normal last year.  Will check her records at home.  History of abnormal pap: " yes    Allergy: Morphine, Penicillins, Dust mites, Cat hair extract, and Pollen extract    Current Medications:  Current Outpatient Medications   Medication Sig Dispense Refill    levETIRAcetam (KEPPRA) 1000 MG tablet       loratadine (CLARITIN) 10 MG tablet Take 10 mg by mouth daily      Prenatal Vit-Fe Fumarate-FA (PRENATAL MULTIVITAMIN W/IRON) 27-0.8 MG tablet Take 1 tablet by mouth daily      levETIRAcetam (KEPPRA) 750 MG tablet 750 mg 2 times daily  (Patient not taking: Reported on 2024)       No current facility-administered medications for this visit.       Past Medical History:  Past Medical History:   Diagnosis Date    Motion sickness     PONV (postoperative nausea and vomiting)     Seizures (H)     Epilepsy  Last        Past Surgical History:  Past Surgical History:   Procedure Laterality Date     SECTION N/A 2022    Procedure:  SECTION;  Surgeon: Yo Lindquist MD;  Location: [facility]    DILATION AND CURETTAGE, OPERATIVE HYSTEROSCOPY, COMBINED N/A 2021    Procedure: HYSTEROSCOPY, REMOVAL OF INTRAUTERINE DEVICE;  Surgeon: Yo Lindquist MD;  Location: [facility] OR    UTERINE FIBROID SURGERY         Social History:  Patient lives with , son.  Patient's relationship status is:  in 2020.    Works as , projector manager.  What used to be [employer].   is in IT.  They both work from home.   Denies current tobacco, alcohol or recreational drug use.     Family History:  Family History   Problem Relation Age of Onset    No Known Problems Mother     No Known Problems Father     No Known Problems Maternal Grandmother     No Known Problems Maternal Grandfather     No Known Problems Paternal Grandmother     No Known Problems Paternal Grandfather     No Known Problems Brother     No Known Problems Sister     No Known Problems Son        Review of Systems  Gen:  no change in weight, no fever, no chills, no fatigue  CV: no  "palpitations, no chest pain, no hypertension, no syncope  Resp: no shortness of breath, no cough, no wheezing, no asthma  GI: no nausea, no vomiting, no diarrhea, no constipation, no bloating, no GERD  :  no vaginal discharge, no dysuria, no abnormal bleeding, no pelvic pain   Endo: no thyroid problems, no cold/heat intolerance, no acne, no hirsutism, no diabetes  Heme: no easy bruising or bleeding, no history of DVT/PE/CVA  Neuro: no headaches, no seizures, no strokes, no focal deficits      Physical Exam:  Vitals:    24 1310 24 1339   BP: (!) 146/91 116/82   BP Location: Left arm Left arm   Patient Position: Sitting Sitting   Cuff Size: Adult Regular Adult Large   Pulse: 77    Temp: 97.5  F (36.4  C)    TempSrc: Oral    SpO2: 91%    Weight: 99.3 kg (218 lb 14.4 oz)    Height: 1.6 m (5' 2.99\")      Body mass index is 38.79 kg/m .  Gen: alert, oriented, no distress  Neck: supple, trachea midline, no thyromegaly, no lymphadenopathy  HEENT: head normocephalic, atraumatic, normal oropharynx without erythema or exudates  CV: normal heart sounds, regular rate and rhythm, no murmurs  Resp: good inspiratory effort, lungs clear to ascultation bilaterally, no wheezes or rhonchi  Abd: soft, nontender, nondistended  Extr: warm, well perfused, nontender, no edema  Psych: affect bright, cooperative, responds appropriately      Assessment:  Lauren Montague is a 34 year old  at 11w6d presenting to establish prenatal care.    Problem List:   Hx Myomectomy:  labor contraindicated  Hx LTCS:  plan repeat 36-37w  Epilepsy:  well controlled.  Follows with neurologist at [facility]  Thrombocytopenia.  Platelets 125 on NOB labs.  Heme referral placed      Plan:  Reviewed routine prenatal care. Discussed MD call schedule as well as role of residents and med students both in clinic and hospital.  She is okay with resident care  Pap: likely UTD.  Will check records   Diet, Nutrition and Exercise:  Continue " PNVs. Continue normal exercise. Her prepregnancy BMI is 40.  According to the WHO guidelines, patient is given a goal of gaining approximately 11-20 pounds during the course of her pregnancy.    Immunizations: plan TdaP at 28 weeks.  Got flu and COVID this year.  Fetal anomaly screening: completed.  Level 2 scheduled  Routine Prenatal Care: the patient will return to clinic in 4 weeks and prjorge a Crawford MD

## 2024-11-13 ENCOUNTER — TELEPHONE (OUTPATIENT)
Dept: MATERNAL FETAL MEDICINE | Facility: CLINIC | Age: 34
End: 2024-11-13
Payer: COMMERCIAL

## 2024-11-13 LAB — SCANNED LAB RESULT: NORMAL

## 2024-11-13 NOTE — TELEPHONE ENCOUNTER
11/13/2024       Called Lauren to discuss NIPT results.  Results came back negative for chromosome abnormalities in chromosomes 21, 18, & 13, as well as the sex chromosomes and select microdeletions.  These test results do not definitively rule out the possibility of one of these conditions, but they do greatly reduce the likelihood.  The test identified sex chromosomes consistent with male sex (XY).  Lauren had no questions at this time and was encouraged to reach out if she has any questions or concerns in the future.       Keny Anderson MS, [facility]  Licensed Genetic Counselor  Phone: [phone]  Pager: [phone]

## 2024-12-03 ENCOUNTER — LAB (OUTPATIENT)
Dept: LAB | Facility: CLINIC | Age: 34
End: 2024-12-03
Payer: COMMERCIAL

## 2024-12-03 DIAGNOSIS — O09.529 ENCOUNTER FOR SUPERVISION OF MULTIGRAVIDA WITH ADVANCED MATERNAL AGE: ICD-10-CM

## 2024-12-03 DIAGNOSIS — R56.9 CONVULSIONS (H): ICD-10-CM

## 2024-12-03 PROCEDURE — 82306 VITAMIN D 25 HYDROXY: CPT

## 2024-12-03 PROCEDURE — 36415 COLL VENOUS BLD VENIPUNCTURE: CPT

## 2024-12-03 PROCEDURE — 80177 DRUG SCRN QUAN LEVETIRACETAM: CPT

## 2024-12-04 LAB
LEVETIRACETAM SERPL-MCNC: 8.5 ÂΜG/ML (ref 10–40)
VIT D+METAB SERPL-MCNC: 35 NG/ML (ref 20–50)

## 2024-12-07 ENCOUNTER — HEALTH MAINTENANCE LETTER (OUTPATIENT)
Age: 34
End: 2024-12-07

## 2024-12-09 ENCOUNTER — PRENATAL OFFICE VISIT (OUTPATIENT)
Dept: OBGYN | Facility: CLINIC | Age: 34
End: 2024-12-09
Payer: COMMERCIAL

## 2024-12-09 VITALS
DIASTOLIC BLOOD PRESSURE: 72 MMHG | OXYGEN SATURATION: 98 % | SYSTOLIC BLOOD PRESSURE: 114 MMHG | HEART RATE: 87 BPM | WEIGHT: 219 LBS | BODY MASS INDEX: 38.8 KG/M2

## 2024-12-09 DIAGNOSIS — O09.529 ENCOUNTER FOR SUPERVISION OF MULTIGRAVIDA WITH ADVANCED MATERNAL AGE: Primary | ICD-10-CM

## 2024-12-09 DIAGNOSIS — G40.909 SEIZURE DISORDER (H): ICD-10-CM

## 2024-12-09 LAB — LEVETIRACETAM SERPL-MCNC: 11.1 ÂΜG/ML (ref 10–40)

## 2024-12-09 PROCEDURE — 99207 PR PRENATAL VISIT: CPT | Performed by: OBSTETRICS & GYNECOLOGY

## 2024-12-09 PROCEDURE — 36415 COLL VENOUS BLD VENIPUNCTURE: CPT | Performed by: OBSTETRICS & GYNECOLOGY

## 2024-12-09 PROCEDURE — 80177 DRUG SCRN QUAN LEVETIRACETAM: CPT | Performed by: OBSTETRICS & GYNECOLOGY

## 2024-12-09 PROCEDURE — 82105 ALPHA-FETOPROTEIN SERUM: CPT | Mod: 90 | Performed by: OBSTETRICS & GYNECOLOGY

## 2024-12-09 PROCEDURE — 99000 SPECIMEN HANDLING OFFICE-LAB: CPT | Performed by: OBSTETRICS & GYNECOLOGY

## 2024-12-09 NOTE — PROGRESS NOTES
16w3d  No concerns. Starting to feel fetal movement. No cramping, no pain, no bleeding, no abnormal discharge.  Keppra dose has needed increase.   msAFP today.   RTC 4w, sooner PRN  Natali Kim MD

## 2024-12-11 LAB
# FETUSES US: NORMAL
AFP MOM SERPL: 1.97
AFP SERPL-MCNC: 55 NG/ML
AGE - REPORTED: 37 YR
CURRENT SMOKER: NO
FAMILY MEMBER DISEASES HX: NO
GA METHOD: NORMAL
GA: NORMAL WK
IDDM PATIENT QL: NO
INTEGRATED SCN PATIENT-IMP: NORMAL
SPECIMEN DRAWN SERPL: NORMAL

## 2024-12-26 ENCOUNTER — OFFICE VISIT (OUTPATIENT)
Dept: HEMATOLOGY | Facility: CLINIC | Age: 34
End: 2024-12-26
Attending: OBSTETRICS & GYNECOLOGY
Payer: COMMERCIAL

## 2024-12-26 VITALS
TEMPERATURE: 97.4 F | BODY MASS INDEX: 39.53 KG/M2 | HEART RATE: 84 BPM | DIASTOLIC BLOOD PRESSURE: 77 MMHG | WEIGHT: 223.1 LBS | OXYGEN SATURATION: 98 % | SYSTOLIC BLOOD PRESSURE: 131 MMHG

## 2024-12-26 DIAGNOSIS — D69.6 THROMBOCYTOPENIA (H): Primary | ICD-10-CM

## 2024-12-26 DIAGNOSIS — Z3A.19 19 WEEKS GESTATION OF PREGNANCY: ICD-10-CM

## 2024-12-26 DIAGNOSIS — O09.529 ENCOUNTER FOR SUPERVISION OF MULTIGRAVIDA WITH ADVANCED MATERNAL AGE: ICD-10-CM

## 2024-12-26 LAB
BASOPHILS # BLD AUTO: 0 10E3/UL (ref 0–0.2)
BASOPHILS NFR BLD AUTO: 0 %
CLOSURE TME COLL+EPINEP BLD: 80 SECONDS
EOSINOPHIL # BLD AUTO: 0.2 10E3/UL (ref 0–0.7)
EOSINOPHIL NFR BLD AUTO: 2 %
ERYTHROCYTE [DISTWIDTH] IN BLOOD BY AUTOMATED COUNT: 13.1 % (ref 10–15)
HCT VFR BLD AUTO: 38.8 % (ref 35–47)
HGB BLD-MCNC: 13.1 G/DL (ref 11.7–15.7)
IMM GRANULOCYTES # BLD: 0 10E3/UL
IMM GRANULOCYTES NFR BLD: 0 %
LYMPHOCYTES # BLD AUTO: 1.5 10E3/UL (ref 0.8–5.3)
LYMPHOCYTES NFR BLD AUTO: 16 %
Lab: NORMAL
MCH RBC QN AUTO: 28.5 PG (ref 26.5–33)
MCHC RBC AUTO-ENTMCNC: 33.8 G/DL (ref 31.5–36.5)
MCV RBC AUTO: 85 FL (ref 78–100)
MONOCYTES # BLD AUTO: 0.5 10E3/UL (ref 0–1.3)
MONOCYTES NFR BLD AUTO: 5 %
NEUTROPHILS # BLD AUTO: 7.5 10E3/UL (ref 1.6–8.3)
NEUTROPHILS NFR BLD AUTO: 78 %
NRBC # BLD AUTO: 0 10E3/UL
NRBC BLD AUTO-RTO: 0 /100
PERFORMING LABORATORY: NORMAL
PLATELET # BLD AUTO: 92 10E3/UL (ref 150–450)
RBC # BLD AUTO: 4.59 10E6/UL (ref 3.8–5.2)
RETICS # AUTO: 0.07 10E6/UL (ref 0.03–0.1)
RETICS/RBC NFR AUTO: 1.6 % (ref 0.5–2)
SPECIMEN STATUS: NORMAL
TEST NAME: NORMAL
WBC # BLD AUTO: 9.7 10E3/UL (ref 4–11)

## 2024-12-26 PROCEDURE — 36415 COLL VENOUS BLD VENIPUNCTURE: CPT | Performed by: PHYSICIAN ASSISTANT

## 2024-12-26 PROCEDURE — 85060 BLOOD SMEAR INTERPRETATION: CPT | Performed by: STUDENT IN AN ORGANIZED HEALTH CARE EDUCATION/TRAINING PROGRAM

## 2024-12-26 PROCEDURE — 85004 AUTOMATED DIFF WBC COUNT: CPT | Performed by: PHYSICIAN ASSISTANT

## 2024-12-26 PROCEDURE — 85576 BLOOD PLATELET AGGREGATION: CPT

## 2024-12-26 PROCEDURE — 85045 AUTOMATED RETICULOCYTE COUNT: CPT | Performed by: PHYSICIAN ASSISTANT

## 2024-12-26 NOTE — PROGRESS NOTES
"    [facility]  [address] [place] [id number]  Main: [phone], Fax: [phone]    Patient seen at: [facility] at [address] [place] [address]    Outpatient Visit Note:    Patient: Lauren Montague  MRN: [id number]  : 1990  VANGIE: 2024  Location of this writer at the time of this clinic visit was conducted: [facility].  Location of the patient at the time of this clinic visit was conducted: [facility].     Reason for visit:  Thrombocytopenia.     HPI:  Lauren is a 34 year old  female with a history of seizures, who is under good control with Keppra, who also has a history of chronic thrombocytopenia as well as a family history of thrombocytopenia, referred by Dr. Natali Crawford of [facility] for consultation as the patient is currently pregnant at around 19 weeks. Lauren also has a history of Myomectomy in the fundal area and thus her last (first) child was delivered via  on 2022.     Lauren has a history of chronic thrombocytopenia with her platelet count at baseline around 120K. This is evidence on her historical records dated back to 2013 where her platelet count at the time was 120K. Between  to Dec 2021, her platelet count ranges from 110K to 131K.     Lauren underwent abdominal myomectomy back in .    Then she became pregnant with her first child back at the end of .     On 2022, she underwent \"repeat \" via her previous Myomectomy (large fundal fibroid) incision to deliver her baby at 41w1d. At the time, her platelet count was 64K and the decision was made to have her undergo general anesthesia. From what I can tell, there was no immediate bleeding complications and she did not require any platelet transfusions though it was " prepared. The  was uneventful and she was discharged on 2022 without any bleeding complications.    Currently, she is pregnant with her second child at 19 weeks gestation. The plan is to have her undergo repeat  delivery at around 37-38 weeks gestation. She saw Dr. Natali Crawford of Ob/Gyn on 2024 and she is referred to our clinic for evaluation of her chronic thrombocytopenia. Her platelet count on 10/10/2024 was 125K. At this visit, Lauren reports that she has a family history of chronically low platelets including her father, brother, and paternal cousins. She also reports that her family's low platelets seems to be worse in males.     Upon further questioning of Lauren today, she reports that she has had no bleeding complications in the past except for report of easy bruising. She never had any significant issues with epistaxis. She reports that both her father's and her brother's platelet count are around 100K and they never experience any issues with bleeding. She has a paternal cousin who also has chronic thrombocytopenia who also required general anesthesia for her  delivery due to low platelet. As far as Lauren knows, none of her family members are followed by any hematologist. Lauren's son, who is currently around 2 years of age, also reportedly has mildly low platelet when they were tested at 1 years of age. Her son has no report of bleeding diathesis.     ROS:  Denies any bleeding complications. Specifically, no frequent epistaxis. No issues with oral mucosal bleeding. Denies any hematuria or blood in stools. Denies any shortness of breath. No chest pain. No cough. No fever.      Medications:  Current Outpatient Medications   Medication Sig Dispense Refill    levETIRAcetam (KEPPRA) 1000 MG tablet       levETIRAcetam (KEPPRA) 750 MG tablet 750 mg 2 times daily  (Patient not taking: Reported on 2024)      loratadine (CLARITIN) 10 MG tablet Take 10 mg by mouth daily       Prenatal Vit-Fe Fumarate-FA (PRENATAL MULTIVITAMIN W/IRON) 27-0.8 MG tablet Take 1 tablet by mouth daily       No current facility-administered medications for this visit.     Allergies:  Allergies   Allergen Reactions    Morphine Nausea and Vomiting    Penicillins Swelling and Other (See Comments)     PN: swelled up    Dust Mites Other (See Comments)    Cat Hair Extract Unknown, Itching and Rash    Pollen Extract Itching and Rash     PmHx:  Past Medical History:   Diagnosis Date    Motion sickness     PONV (postoperative nausea and vomiting)     Seizures (H)     Epilepsy  Last        Social History:   Deferred    Family History:  As described above.    Objective:  Vitals: /77 (BP Location: Right arm)   Pulse 84   Temp 97.4  F (36.3  C) (Tympanic)   Wt 101.2 kg (223 lb 1.6 oz)   LMP 2024   SpO2 98%   BMI 39.53 kg/m    Exam:   She is in no acute distress.  Complete exam is not performed today.       Labs:  Reviewed and are as described above.     Component      Latest Ref Rng 10/10/2024  1:38 PM   WBC      4.0 - 11.0 10e3/uL 10.2    RBC Count      3.80 - 5.20 10e6/uL 5.09    Hemoglobin      11.7 - 15.7 g/dL 14.6    Hematocrit      35.0 - 47.0 % 43.2    MCV      78 - 100 fL 85    MCH      26.5 - 33.0 pg 28.7    MCHC      31.5 - 36.5 g/dL 33.8    RDW      10.0 - 15.0 % 12.7    Platelet Count      150 - 450 10e3/uL 125 (L)       Assessment:  In summary, Lauren is a 34 year old  female with a history of seizures, who is under good control with Keppra, who also has a history of chronic thrombocytopenia as well as a family history of thrombocytopenia, referred by Dr. Natali Crawford of [facility] clinic for consultation as the patient is currently pregnant at around 19 weeks.    As mentioned above, she has had chronic thrombocytopenia at least since  with her baseline platelet count around 120K. She has had no bleeding diathesis despite having her platelet count dropped  down to the 60s at the time of her  delivery (under general anesthesia) of her first child back in . She also has no lifelong history of bleeding diathesis.     The differential diagnosis of thrombocytopenia is broad which include but not limited to:  Chronic ITP.   Congenital thrombocytopenia. Considering that she also has a family history of thrombocytopenia, this diagnosis is rather high on the list.   Type 2B Von Willebrand Disease. This is unlikely as she has had no history of bleeding diathesis.   Medication induced thrombocytopenia. As far as I understand, thrombocytopenia is not a common side effect of Keppra, thus I do not feel that Keppra is the cause of her chronic thrombocytopenia.   Pregnancy induced thrombocytopenia. This is also unlikely as she has had thrombocytopenia prior to her ever becoming pregnant.   Artificial platelet clumping such as reaction to EDTA within the blood collection tubes.   Giant platelet syndrome.     Diagnosis:  Chronic thrombocytopenia.  Family history of thrombocytopenia  Pregnant at 19 weeks gestation.     Plan:  I spent some time today to discuss the above differential diagnosis with her today. 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 However, we should rule out Type 2B Von Willebrand Disease prior to her delivery along others as described above.     Thus I will start off with proceeding with performing some labs including VWF profile, CBC with platelet and differential, Peripheral blood smear morphology, Platelet function screening test, Platelet aggregation study and Platelet electron microscopy. We will also consider having speak to our genetic counselor after she has delivered her current child and consider genetic testing for congenital thrombocytopenia.     Management and monitoring of her platelet count during the remainder of her pregnancy is as follow:  Antepartm: Will plan to  check platelet count once every 2-4 weeks until 32 weeks gestation. Then after 32 weeks gestation, will have her get platelet count weekly.   Labor and delivery: 1) Platelet count of > 70K for neuraxial anesthesia. 2) Keep platelet count >50K leading up to delivery (natural vaginal delivery or ) as predicting bleeding is challenging at platelet count of 30K to 50K. 3) Platelet count of 30K or below is concerning for spontaneous hemorrhage during pregnancy.   Post partum: Monitor bleeding and monitor platelet count daily until discharge. Will have her return to see me for follow up 1 month after delivery to discuss further hematological testing / genetic testing.     Will provide more specific recommendation once the above lab test results are back.       Rinku Melton PA-C, MPAS  Physician Assistant  [facility].     The longitudinal plan of care for these conditions below were addressed during this visit. Due to the added complexity in care, I will continue to support Lauren Montague  in the subsequent management of these conditions and with the ongoing continuity of care of these conditions.    Problem List Items Addressed This Visit as of 2024   Chronic thrombocytopenia.  Family history of thrombocytopenia  Pregnant at 19 weeks gestation.     45 minutes spent by me on the date of the encounter doing chart review, history and exam, documentation and further activities per the note       does not have Type 2B Von Willebrand Disease. Platelet aggregation study and platelet electron-microscopy testing are still pending at this time.     Final Plan:  Monitor platelet count every 3 weeks until 32 weeks gestation, then monitor platelet count weekly. I have placed standing orders for this and the patient is instructed to call her nearest [facility] to make a lab only appointment to get this done every 3 weeks.   Virtual visit with me at around 30-32 weeks gestation to finalize the labor and delivery plan.  Consider genetic testing for congenital thrombocytopenia after delivery.     I have answered all Lauren's questions to her satisfaction.       Rinku Melton PA-C, MPAS  Physician Assistant  [facility].

## 2024-12-27 LAB
PATH REPORT.COMMENTS IMP SPEC: NORMAL
PATH REPORT.COMMENTS IMP SPEC: NORMAL
PATH REPORT.FINAL DX SPEC: NORMAL
PATH REPORT.MICROSCOPIC SPEC OTHER STN: NORMAL
PATH REPORT.MICROSCOPIC SPEC OTHER STN: NORMAL
PATH REPORT.RELEVANT HX SPEC: NORMAL

## 2024-12-30 ENCOUNTER — OFFICE VISIT (OUTPATIENT)
Dept: MATERNAL FETAL MEDICINE | Facility: HOSPITAL | Age: 34
End: 2024-12-30
Attending: STUDENT IN AN ORGANIZED HEALTH CARE EDUCATION/TRAINING PROGRAM
Payer: COMMERCIAL

## 2024-12-30 ENCOUNTER — ANCILLARY PROCEDURE (OUTPATIENT)
Dept: ULTRASOUND IMAGING | Facility: HOSPITAL | Age: 34
End: 2024-12-30
Attending: STUDENT IN AN ORGANIZED HEALTH CARE EDUCATION/TRAINING PROGRAM
Payer: COMMERCIAL

## 2024-12-30 DIAGNOSIS — O09.521 MULTIGRAVIDA OF ADVANCED MATERNAL AGE IN FIRST TRIMESTER: ICD-10-CM

## 2024-12-30 DIAGNOSIS — O99.352 SEIZURE DISORDER DURING PREGNANCY IN SECOND TRIMESTER (H): ICD-10-CM

## 2024-12-30 DIAGNOSIS — O26.90 PREGNANCY RELATED CONDITION, ANTEPARTUM: Primary | ICD-10-CM

## 2024-12-30 DIAGNOSIS — O99.210 OBESITY IN PREGNANCY: ICD-10-CM

## 2024-12-30 DIAGNOSIS — G40.909 SEIZURE DISORDER DURING PREGNANCY IN SECOND TRIMESTER (H): ICD-10-CM

## 2024-12-30 PROCEDURE — 76811 OB US DETAILED SNGL FETUS: CPT | Mod: 26 | Performed by: STUDENT IN AN ORGANIZED HEALTH CARE EDUCATION/TRAINING PROGRAM

## 2024-12-30 PROCEDURE — 99213 OFFICE O/P EST LOW 20 MIN: CPT | Mod: 25 | Performed by: STUDENT IN AN ORGANIZED HEALTH CARE EDUCATION/TRAINING PROGRAM

## 2024-12-30 PROCEDURE — 76811 OB US DETAILED SNGL FETUS: CPT

## 2024-12-30 NOTE — PROGRESS NOTES
"Please see \"Imaging\" tab under \"Chart Review\" for details of today's visit.    Kylie Johnson    "

## 2024-12-30 NOTE — NURSING NOTE
Lauren Montague is a  at 19w3d who presents to [facility] for L2 ultrasound. Pt reports positive fetal movement. Pt denies bldg/lof/change in discharge, contractions, headache, vision changes, chest pain/SOB or edema. SBAR given to Dr. MYRA Johnson, see note in Epic.      Please follow up with DR [facility] IN [place] on Monday 6/5/23 for INR check.

## 2024-12-31 LAB
FACT VIII ACT/NOR PPP: 199 % (ref 55–200)
VWF AG ACT/NOR PPP IA: 208 % (ref 50–200)
VWF:AC ACT/NOR PPP IA: 208 % (ref 50–180)

## 2025-01-02 LAB
MAYO MISC RESULT: NORMAL
VWF MULTIMERS PPP IB: NORMAL

## 2025-01-06 ENCOUNTER — PRENATAL OFFICE VISIT (OUTPATIENT)
Dept: OBGYN | Facility: CLINIC | Age: 35
End: 2025-01-06
Payer: COMMERCIAL

## 2025-01-06 ENCOUNTER — TELEPHONE (OUTPATIENT)
Dept: HEMATOLOGY | Facility: CLINIC | Age: 35
End: 2025-01-06

## 2025-01-06 VITALS
BODY MASS INDEX: 39.32 KG/M2 | HEART RATE: 74 BPM | DIASTOLIC BLOOD PRESSURE: 73 MMHG | OXYGEN SATURATION: 98 % | SYSTOLIC BLOOD PRESSURE: 111 MMHG | WEIGHT: 221.9 LBS

## 2025-01-06 DIAGNOSIS — R73.09 IMPAIRED GLUCOSE TOLERANCE TEST: ICD-10-CM

## 2025-01-06 DIAGNOSIS — O09.529 ENCOUNTER FOR SUPERVISION OF MULTIGRAVIDA WITH ADVANCED MATERNAL AGE: Primary | ICD-10-CM

## 2025-01-06 LAB — VWF MULTIMERS PPP QL: NORMAL

## 2025-01-06 PROCEDURE — 99207 PR PRENATAL VISIT: CPT | Performed by: OBSTETRICS & GYNECOLOGY

## 2025-01-06 RX ORDER — FAMOTIDINE 20 MG
2000 TABLET ORAL DAILY
COMMUNITY

## 2025-01-06 NOTE — LETTER
January 6, 2025      Lauren Montague  [address] [place] [address]        To Whom It May Concern:    Lauren Montague was seen in our clinic. I do NOT recommend she ski during pregnancy.      Sincerely,        Natali Crawford MD    Electronically signed

## 2025-01-06 NOTE — PATIENT INSTRUCTIONS
"[email or web address]    Scroll to the very bottom of the page (past the map) to review the virtual birthplace tour.    OR is shown at about 1:50 in the video        Weeks 18 to 22 of Your Pregnancy: Care Instructions  At this stage you may find that your nausea and fatigue are gone. You may feel better overall and have more energy. But you might now also have some new discomforts, like sleep problems or leg cramps.    You may start to feel your baby move. These movements can feel like butterflies or bubbles.   Babies at this stage can now suck their thumbs.     Get some exercise every day.  And avoid caffeine late in the day.     Take a warm shower or bath before bed.  Try relaxation exercises to calm your mind and body.     Use extra pillows.  They can help you get comfortable.     Don't use sleeping pills or alcohol.  They could harm your baby.     For leg cramps, stretch and apply heat.  A warm bath, leg warmers, a heating pad, or a hot water bottle can help with muscle aches.   Stretches for leg cramps    Straighten your leg and bend your foot (flex your ankle) slowly upward, toward your knee. Bend your toes up and down.   Stand on a flat surface. Stretch your toes upward. For balance, hold on to the wall or something stable. If it feels okay, take small steps walking on your heels.   Follow-up care is a key part of your treatment and safety. Be sure to make and go to all appointments, and call your doctor if you are having problems. It's also a good idea to know your test results and keep a list of the medicines you take.  Where can you learn more?  Go to https://www.[employer].net/patiented  Enter W603 in the search box to learn more about \"Weeks 18 to 22 of Your Pregnancy: Care Instructions.\"  Current as of: April 30, 2024  Content Version: 14.3    2024 [employer].   Care instructions adapted under license by your healthcare " professional. If you have questions about a medical condition or this instruction, always ask your healthcare professional. [employer] disclaims any warranty or liability for your use of this information.    Weeks 22 to 26 of Your Pregnancy: Care Instructions  Your baby's lungs are getting ready for breathing. Your baby may respond to your voice. Your baby likely turns less, and kicks or jerks more. Jerking may mean that your baby has hiccups.    Think about taking childbirth classes. And start to think about whether you want to have pain medicine during labor.   At your next doctor visit, you may be tested for anemia and for high blood sugar that first occurs during pregnancy (gestational diabetes). These conditions can cause problems for you and your baby.         To ease discomfort, such as back pain   Change your position often. Try not to sit or stand for too long.  Get some exercise. Things like walking or stretching may help.  Try using a heating pad or cold pack.        To ease or reduce swelling in your feet, ankles, hands, and fingers   Take off your rings.  Avoid high-sodium foods, such as potato chips.  Prop up your feet, and sleep with pillows under your feet.  Try to avoid standing for long periods of time.  Do not wear tight shoes.  Wear support stockings.  Kegel exercises to prevent urine from leaking    Squeeze your muscles as if you were trying not to pass gas. Your belly, legs, and buttocks shouldn't move. Hold the squeeze for 3 seconds, then relax for 5 to 10 seconds.    Add 1 second each week until you can squeeze for 10 seconds. Repeat the exercise 10 times a session. Do 3 to 8 sessions a day. If these exercises cause you pain, stop doing them and talk with your doctor.  Follow-up care is a key part of your treatment and safety. Be sure to make and go to all appointments, and call your doctor if you are having problems. It's also a good idea to know your test results and keep a list  "of the medicines you take.  Where can you learn more?  Go to https://www.[employer]oke[employer].net/patiented  Enter G264 in the search box to learn more about \"Weeks 22 to 26 of Your Pregnancy: Care Instructions.\"  Current as of: April 30, 2024  Content Version: 14.3    2024 [email or web address].   Care instructions adapted under license by your healthcare professional. If you have questions about a medical condition or this instruction, always ask your healthcare professional. [email or web address] disclaims any warranty or liability for your use of this information.    Round Ligament Pain: Care Instructions  Overview     Round ligament pain is a common pain during pregnancy. You may feel a sharp brief pain on one or both sides of your belly. It may go down into your groin. It's usually felt for the first time during the second trimester. This pain is a normal part of pregnancy.  Your uterus is supported by two ligaments that go from the top and sides of the uterus to the bones of the pelvis. These are the round ligaments. As your uterus grows, these ligaments stretch and tighten with your movements. This may be the cause of the pain. You may find that certain activities seem to cause pain. If you can, avoid those activities.  Your doctor can usually diagnose round ligament pain from your symptoms and an exam. If you have bleeding or other symptoms, your doctor may also do an imaging test, such as an ultrasound. Your doctor may suggest some things that can help the pain, such as rest and strengthening exercises.  Follow-up care is a key part of your treatment and safety. Be sure to make and go to all appointments, and call your doctor if you are having problems. It's also a good idea to know your test results and keep a list of the medicines you take.  How can you care for yourself at home?  If certain movements seem to trigger belly pain, see if you can avoid them or try moving more slowly so the ligaments don't stretch " "quickly.  Stay active. If your doctor says it's okay, try moderate exercise. You might try things like swimming, walking, or stretching. Ask your doctor about strengthening and stretching exercises that may help.  Try a heating pad or cold pack on the area. A warm bath or shower may also help.  Rest when you can.  Ask your doctor about taking acetaminophen for pain. Be safe with medicines. Read and follow all instructions on the label.  Try a belly support band. Some people find that these can help.  When should you call for help?   Call your doctor now or seek immediate medical care if:    You think you might be in labor.     You have new or worse pain.   Watch closely for changes in your health, and be sure to contact your doctor if you have any problems.  Where can you learn more?  Go to https://www.[employer]oolink.net/patiented  Enter R110 in the search box to learn more about \"Round Ligament Pain: Care Instructions.\"  Current as of: April 30, 2024  Content Version: 14.3    2024 [employer].   Care instructions adapted under license by your healthcare professional. If you have questions about a medical condition or this instruction, always ask your healthcare professional. [employer] disclaims any warranty or liability for your use of this information.    Leg and Ankle Edema: Care Instructions  Your Care Instructions  Swelling in the legs, ankles, and feet is called edema. It is common after you sit or stand for a while. Long plane flights or car rides often cause swelling in the legs and feet. You may also have swelling if you have to stand for long periods of time at your job. Problems with the veins in the legs (varicose veins) and changes in hormones can also cause swelling. Sometimes the swelling in the ankles and feet is caused by a more serious problem, such as heart failure, infection, blood clots, or liver or kidney disease.  Follow-up care is a key part of your treatment and safety. " "Be sure to make and go to all appointments, and call your doctor if you are having problems. It's also a good idea to know your test results and keep a list of the medicines you take.  How can you care for yourself at home?  If your doctor gave you medicine, take it as prescribed. Call your doctor if you think you are having a problem with your medicine.  Whenever you are resting, raise your legs up. Try to keep the swollen area higher than the level of your heart.  Take breaks from standing or sitting in one position.  Walk around to increase the blood flow in your lower legs.  Move your feet and ankles often while you stand, or tighten and relax your leg muscles.  Wear support stockings. Put them on in the morning, before swelling gets worse.  Eat a balanced diet. Lose weight if you need to.  Limit the amount of salt (sodium) in your diet. Salt holds fluid in the body and may increase swelling.  When should you call for help?   Call 911 anytime you think you may need emergency care. For example, call if:    You have symptoms of a blood clot in your lung (called a pulmonary embolism). These may include:  Sudden chest pain.  Trouble breathing.  Coughing up blood.   Call your doctor now or seek immediate medical care if:    You have signs of a blood clot, such as:  Pain in your calf, back of the knee, thigh, or groin.  Redness and swelling in your leg or groin.     You have symptoms of infection, such as:  Increased pain, swelling, warmth, or redness.  Red streaks or pus.  A fever.   Watch closely for changes in your health, and be sure to contact your doctor if:    Your swelling is getting worse.     You have new or worsening pain in your legs.     You do not get better as expected.   Where can you learn more?  Go to https://www.healthwise.net/patiented  Enter N696 in the search box to learn more about \"Leg and Ankle Edema: Care Instructions.\"  Current as of: April 30, 2024  Content Version: 14.3    2024 [employer].   Care instructions adapted under license by your healthcare professional. If you have questions about a medical condition or this instruction, always ask your healthcare professional. [employer] disclaims any warranty or liability for your use of this information.    Back Pain During Pregnancy: Care Instructions  Overview     Back pain has many possible causes. It is often caused by problems with muscles and ligaments in your back. The extra weight during pregnancy can put stress on your back. Moving, lifting, standing, sitting, or sleeping in an awkward way also can strain your back. Back pain can also be a sign of labor. Although it may hurt a lot, back pain often improves on its own. Use good home treatment, and take care not to stress your back.  Follow-up care is a key part of your treatment and safety. Be sure to make and go to all appointments, and call your doctor if you are having problems. It's also a good idea to know your test results and keep a list of the medicines you take.  How can you care for yourself at home?  Ask your doctor about taking acetaminophen (Tylenol) for pain. Do not take aspirin, ibuprofen (Advil, Motrin), or naproxen (Aleve).  Do not take two or more pain medicines at the same time unless the doctor told you to. Many pain medicines have acetaminophen, which is Tylenol. Too much acetaminophen (Tylenol) can be harmful.  Try heat or ice, whichever feels better. Apply it for 10 to 20 minutes at a time, several times a day. Put a thin cloth between the heat or ice and your skin. A warm bath or shower may also help.  Lie on your left side with your knees and hips bent and a pillow between your legs. This reduces stress on your back.  Try to avoid standing or sitting for too long or heavy lifting. If your job requires lots of standing, sitting, or heavy lifting, ask your employer if you can take short breaks or adjust your work activity. You can ask your doctor  "to write a note requesting these breaks or other adjustments.  Wear supportive, low-heeled shoes. Avoid flat or high-heeled shoes.  Try a belly support band. Supporting your belly can take the strain off your back.  Ask your doctor about how much exercise you can do. Regular exercise such as swimming, water aerobics, walking, or stretching can help with back pain.  Ask your doctor about exercises to stretch, strengthen, and relax your muscles. Your doctor may recommend physical therapy.  When should you call for help?   Call your doctor now or seek immediate medical care if:    You've been having regular contractions for an hour. This means that you've had at least 6 contractions within 1 hour, even after you change your position and drink fluids.     You have new numbness in your buttocks, genital or rectal areas, or legs.     You have a new loss of bowel or bladder control.     You have symptoms of a urinary tract infection. These may include:  Pain or burning when you urinate.  A frequent need to urinate without being able to pass much urine.  Pain in the flank, which is just below the rib cage and above the waist on either side of the back.  Blood in your urine.   Watch closely for changes in your health, and be sure to contact your doctor if:    You do not get better as expected.   Where can you learn more?  Go to https://www.DWNLD.net/patiented  Enter C696 in the search box to learn more about \"Back Pain During Pregnancy: Care Instructions.\"  Current as of: 2024  Content Version: 14.3    2024 [employer].   Care instructions adapted under license by your healthcare professional. If you have questions about a medical condition or this instruction, always ask your healthcare professional. [employer] disclaims any warranty or liability for your use of this information.     Labor: Care Instructions  Overview      labor is the start of labor between 20 and 36 " weeks of pregnancy. Most babies are born at 37 to 42 weeks of pregnancy. In labor, the uterus contracts to open the cervix. This is the first stage of childbirth.  labor can be caused by a problem with the baby, the mother, or both. Often the cause is not known.  In some cases, doctors use medicines to try to delay labor until 34 or more weeks of pregnancy. By this time, a baby has grown enough so that problems are not likely. In some cases--such as with a serious infection--it is healthier for the baby to be born early. Your treatment will depend on how far along you are in your pregnancy and on your health and your baby's health.  Follow-up care is a key part of your treatment and safety. Be sure to make and go to all appointments, and call your doctor if you are having problems. It's also a good idea to know your test results and keep a list of the medicines you take.  How can you care for yourself at home?  If your doctor prescribed medicines, take them exactly as directed. Call your doctor if you think you are having a problem with your medicine.  Rest until your doctor advises you about activity.  Do not have sexual intercourse unless your doctor says it is safe.  Use sanitary pads if you have vaginal bleeding. Using pads makes it easier to monitor your bleeding.  Do not smoke or allow others to smoke around you. If you need help quitting, talk to your doctor about stop-smoking programs and medicines. These can increase your chances of quitting for good.  When should you call for help?   Call 911  anytime you think you may need emergency care. For example, call if:    You passed out (lost consciousness).     You have a seizure.     You have severe vaginal bleeding.     You have severe pain in your belly or pelvis that doesn't get better between contractions.     You have had fluid gushing or leaking from your vagina and you know or think the umbilical cord is bulging into your vagina. If this happens,  "immediately get down on your knees so your rear end (buttocks) is higher than your head. This will decrease the pressure on the cord until help arrives.   Call your doctor now or seek immediate medical care if:    You have signs of preeclampsia, such as:  Sudden swelling of your face, hands, or feet.  New vision problems (such as dimness, blurring, or seeing spots).  A severe headache.     You have any vaginal bleeding.     You have belly pain or cramping.     You have a fever.     You have had regular contractions (with or without pain) for an hour. This means that you have 6 or more within 1 hour after you change your position and drink fluids.     You have a sudden release of fluid from the vagina.     You have low back pain or pelvic pressure that does not go away.     You notice that your baby has stopped moving or is moving much less than normal.   Watch closely for changes in your health, and be sure to contact your doctor if you have any problems.  Where can you learn more?  Go to https://www.MEEP.net/patiented  Enter Q400 in the search box to learn more about \" Labor: Care Instructions.\"  Current as of: 2024  Content Version: 14.3    2024 [employer].   Care instructions adapted under license by your healthcare professional. If you have questions about a medical condition or this instruction, always ask your healthcare professional. [employer] disclaims any warranty or liability for your use of this information.    "

## 2025-01-06 NOTE — TELEPHONE ENCOUNTER
1/6 Patient confirmed scheduled appointment:  Date: 1/9/2025  Time: 9:30 am  Visit type: Spec Coagulation Platelet Aggregation  Provider: IRVING Spec Coagulation Platelet Aggregation  [place] [facility] [place]imaging: n/a  Additional notes: n/a    Labs/EKG/Imaging Studies/Medications

## 2025-01-06 NOTE — PROGRESS NOTES
20w3d  Going on a ski trip, but does not plan to ski.  Provided letter so she can get refund on lift tickets.  Looked up previous records:  pap from 2021, NIL and HPV neg.  Will send via [employer].  In 2023, IUD removed and saw cervical polyp.  Path neg on polyp.    Has been thinking about delivery and things we could try to do to help with anxiety.  Showed her OR on virtual birthplace tour.  Notes her  not there when she went to sleep, but was brought back right away.  There before baby was born and throughout surgery.  Saw uterus exteriorized, etc and did fine.  Wondering if that could be done again, if she needs GET due to platelet count.  Discussed that shouldn't likely be a problem if everything goes as planned and on a scheduled basis.    Saw hematology  Has plans to follow platelets and see them again around 32w.    RTC 4w with GCT  Natali Crawford MD

## 2025-01-06 NOTE — Clinical Note
Please abstract the following data from this visit with this patient into the appropriate field in Epic:  Tests that can be patient reported without a hard copy:  Pap smear/HPV done on this date: 2/2021 (approximately), by this group: [facility], results were neg.   Other Tests found in the patient's chart through Chart Review/Care Everywhere:  {Abstract Quality List (Optional):053417}  Note to Abstraction: If this section is blank, no results were found via Chart Review/Care Everywhere.

## 2025-01-07 LAB — VON WILLEBRAND EVAL PPP-IMP: NORMAL

## 2025-01-09 ENCOUNTER — OFFICE VISIT (OUTPATIENT)
Dept: LAB | Facility: CLINIC | Age: 35
End: 2025-01-09
Payer: COMMERCIAL

## 2025-01-09 DIAGNOSIS — O09.529 ENCOUNTER FOR SUPERVISION OF MULTIGRAVIDA WITH ADVANCED MATERNAL AGE: ICD-10-CM

## 2025-01-09 LAB
EPI 10 MICROMOLAR REPEAT: 25 %
ERYTHROCYTE [DISTWIDTH] IN BLOOD BY AUTOMATED COUNT: 13.2 % (ref 10–15)
EST. AVERAGE GLUCOSE BLD GHB EST-MCNC: 103 MG/DL
GLUCOSE 1H P 50 G GLC PO SERPL-MCNC: 137 MG/DL (ref 70–129)
HBA1C MFR BLD: 5.2 %
HCT VFR BLD AUTO: 39.2 % (ref 35–47)
HGB BLD-MCNC: 13.3 G/DL (ref 11.7–15.7)
HOLD SPECIMEN: NORMAL
MCH RBC QN AUTO: 28.4 PG (ref 26.5–33)
MCHC RBC AUTO-ENTMCNC: 33.9 G/DL (ref 31.5–36.5)
MCV RBC AUTO: 84 FL (ref 78–100)
PA AA BLD-ACNC: 26 %
PA AA BLD-ACNC: 27 %
PA ADP BLD-ACNC: 23 %
PA ADP BLD-ACNC: 26 %
PA ADP BLD-ACNC: 26 %
PA ADP BLD-ACNC: 27 %
PA ADP BLD-ACNC: <0.1 NM
PA COLL PRP QL: 13 %
PA COLL PRP QL: 17 %
PA COLL PRP QL: 30 %
PA COLL PRP QL: 31 %
PA EPINEPH PRP QL: 25 %
PA RIST PRP QL: 11 %
PA RIST PRP QL: 15 %
PA RIST PRP QL: 51 %
PA RIST PRP QL: 53 %
PA RIST PRP QL: 54 %
PA RIST PRP QL: 58 %
PA RIST PRP QL: 62 %
PA RIST PRP QL: ABNORMAL
PA THROMBIN PRP: 0.38 NM
PA THROMBIN PRP: 0.42 NM
PLATELET # BLD AUTO: 100 10E3/UL (ref 150–450)
RBC # BLD AUTO: 4.69 10E6/UL (ref 3.8–5.2)
T PALLIDUM AB SER QL: NONREACTIVE
WBC # BLD AUTO: 11 10E3/UL (ref 4–11)

## 2025-01-09 PROCEDURE — 85576 BLOOD PLATELET AGGREGATION: CPT | Performed by: PATHOLOGY

## 2025-01-09 PROCEDURE — 99000 SPECIMEN HANDLING OFFICE-LAB: CPT | Performed by: PATHOLOGY

## 2025-01-09 PROCEDURE — 85027 COMPLETE CBC AUTOMATED: CPT | Performed by: PATHOLOGY

## 2025-01-09 PROCEDURE — 36415 COLL VENOUS BLD VENIPUNCTURE: CPT | Performed by: PATHOLOGY

## 2025-01-09 PROCEDURE — 86780 TREPONEMA PALLIDUM: CPT | Performed by: OBSTETRICS & GYNECOLOGY

## 2025-01-09 PROCEDURE — 85576 BLOOD PLATELET AGGREGATION: CPT | Mod: 26 | Performed by: PATHOLOGY

## 2025-01-09 PROCEDURE — 83036 HEMOGLOBIN GLYCOSYLATED A1C: CPT | Performed by: OBSTETRICS & GYNECOLOGY

## 2025-01-09 PROCEDURE — 82950 GLUCOSE TEST: CPT | Performed by: PATHOLOGY

## 2025-02-03 ENCOUNTER — LAB (OUTPATIENT)
Dept: LAB | Facility: CLINIC | Age: 35
End: 2025-02-03
Payer: COMMERCIAL

## 2025-02-03 ENCOUNTER — PRENATAL OFFICE VISIT (OUTPATIENT)
Dept: OBGYN | Facility: CLINIC | Age: 35
End: 2025-02-03
Payer: COMMERCIAL

## 2025-02-03 VITALS
DIASTOLIC BLOOD PRESSURE: 67 MMHG | SYSTOLIC BLOOD PRESSURE: 107 MMHG | OXYGEN SATURATION: 98 % | HEART RATE: 88 BPM | BODY MASS INDEX: 40.04 KG/M2 | WEIGHT: 226 LBS

## 2025-02-03 DIAGNOSIS — O34.219 PREVIOUS CESAREAN DELIVERY, ANTEPARTUM CONDITION OR COMPLICATION: ICD-10-CM

## 2025-02-03 DIAGNOSIS — D69.6 THROMBOCYTOPENIA: ICD-10-CM

## 2025-02-03 DIAGNOSIS — Z3A.19 19 WEEKS GESTATION OF PREGNANCY: ICD-10-CM

## 2025-02-03 DIAGNOSIS — Z98.890 HISTORY OF MYOMECTOMY: ICD-10-CM

## 2025-02-03 DIAGNOSIS — R73.09 IMPAIRED GLUCOSE TOLERANCE TEST: ICD-10-CM

## 2025-02-03 DIAGNOSIS — O09.92 HIGH-RISK PREGNANCY IN SECOND TRIMESTER: Primary | ICD-10-CM

## 2025-02-03 LAB
BURR CELLS BLD QL SMEAR: ABNORMAL
GESTATIONAL GTT 1 HR POST DOSE: 141 MG/DL (ref 60–179)
GESTATIONAL GTT 2 HR POST DOSE: 138 MG/DL (ref 60–154)
GESTATIONAL GTT 3 HR POST DOSE: 121 MG/DL (ref 60–139)
GLUCOSE P FAST SERPL-MCNC: 82 MG/DL (ref 60–94)
HGB BLD-MCNC: 13.2 G/DL (ref 11.7–15.7)
PLAT MORPH BLD: ABNORMAL
PLATELET # BLD AUTO: 90 10E3/UL (ref 150–450)
POLYCHROMASIA BLD QL SMEAR: SLIGHT
RBC MORPH BLD: ABNORMAL

## 2025-02-03 PROCEDURE — 85049 AUTOMATED PLATELET COUNT: CPT | Performed by: OBSTETRICS & GYNECOLOGY

## 2025-02-03 PROCEDURE — 85018 HEMOGLOBIN: CPT | Performed by: OBSTETRICS & GYNECOLOGY

## 2025-02-03 PROCEDURE — 99207 PR PRENATAL VISIT: CPT | Performed by: OBSTETRICS & GYNECOLOGY

## 2025-02-03 PROCEDURE — 82951 GLUCOSE TOLERANCE TEST (GTT): CPT

## 2025-02-03 PROCEDURE — 82952 GTT-ADDED SAMPLES: CPT

## 2025-02-03 PROCEDURE — 36415 COLL VENOUS BLD VENIPUNCTURE: CPT

## 2025-02-03 NOTE — PROGRESS NOTES
24w3d  1 hour GCT was done early, but was elevated.  Completed 3 hour GTT today.  Results not yet available.  Discussed QID blood sugar checking if 2 or more elevated values  Terrible congestion.  Already using humidifier.  Considering saline nasal spray.  Uncomfortable, restless, trouble sleeping.  Hasn't pulled out pregnancy pillow yet, as toddler shares their bed.  Considering ways to transition him from their bed before baby comes.  Following with heme for thrombocytopenia.  Will recheck platelets today.  Aware of cut offs for spinal and potential need for GET with repeat .  Plan for anesthesia consult in third tri.  S>D today.  Has growth later this month.  Plan to start looking at c/s scheduling at next visit.  36-37w due to hx of myomectomy of 17cm fundal fibroid  Hgb today, along with plt per heme.  RTC scheduled in ~3w.  Natali Crawford MD

## 2025-02-13 ENCOUNTER — LAB (OUTPATIENT)
Dept: LAB | Facility: CLINIC | Age: 35
End: 2025-02-13
Payer: COMMERCIAL

## 2025-02-13 DIAGNOSIS — G40.909 SEIZURE DISORDER (H): ICD-10-CM

## 2025-02-26 ENCOUNTER — PRENATAL OFFICE VISIT (OUTPATIENT)
Dept: OBGYN | Facility: CLINIC | Age: 35
End: 2025-02-26
Payer: COMMERCIAL

## 2025-02-26 VITALS
WEIGHT: 223 LBS | DIASTOLIC BLOOD PRESSURE: 75 MMHG | OXYGEN SATURATION: 98 % | HEART RATE: 83 BPM | SYSTOLIC BLOOD PRESSURE: 113 MMHG | BODY MASS INDEX: 39.51 KG/M2

## 2025-02-26 DIAGNOSIS — O34.219 PREVIOUS CESAREAN DELIVERY, ANTEPARTUM CONDITION OR COMPLICATION: Primary | ICD-10-CM

## 2025-02-26 DIAGNOSIS — Z98.890 HISTORY OF MYOMECTOMY: ICD-10-CM

## 2025-02-26 PROCEDURE — 0502F SUBSEQUENT PRENATAL CARE: CPT | Performed by: OBSTETRICS & GYNECOLOGY

## 2025-02-26 PROCEDURE — 3074F SYST BP LT 130 MM HG: CPT | Performed by: OBSTETRICS & GYNECOLOGY

## 2025-02-26 PROCEDURE — 3078F DIAST BP <80 MM HG: CPT | Performed by: OBSTETRICS & GYNECOLOGY

## 2025-02-26 PROCEDURE — 90471 IMMUNIZATION ADMIN: CPT | Performed by: OBSTETRICS & GYNECOLOGY

## 2025-02-26 PROCEDURE — 99207 PR PRENATAL VISIT: CPT | Performed by: OBSTETRICS & GYNECOLOGY

## 2025-02-26 PROCEDURE — 90715 TDAP VACCINE 7 YRS/> IM: CPT | Performed by: OBSTETRICS & GYNECOLOGY

## 2025-02-26 NOTE — PROGRESS NOTES
27w5d  Feeling better than previous visit.  Flu did go through the family, but recovering now.  Sleeping much better.  Overall in a much better place than she was at last visit.  Ready to schedule c/s.  Declines BS.  History of myomectomy, so plan for 36-37w.  S>D, but has growth on Friday.  First baby was 8lb at 41w.  Plans to recheck platelets when she has lab visit for Keppra level next week.  Tdap today.  Scheduled to see Dr. Worrell in 2w.  Natali Crawford MD

## 2025-02-27 ENCOUNTER — TELEPHONE (OUTPATIENT)
Dept: OBGYN | Facility: CLINIC | Age: 35
End: 2025-02-27
Payer: COMMERCIAL

## 2025-02-27 NOTE — TELEPHONE ENCOUNTER
Type of surgery: ob  Location of surgery: [facility] OR  Date and time of surgery: 04/30/25 8:30AM  Surgeon: Yvette  Pre-Op Appt Date: surgeon  Post-Op Appt Date: provider schedules not that far out yet   Packet sent out: Yes  Pre-cert/Authorization completed:  Not Applicable  Date: 02/27/25       KECIA Padilla  She/her/hers  [facility]

## 2025-02-28 ENCOUNTER — ANCILLARY PROCEDURE (OUTPATIENT)
Dept: ULTRASOUND IMAGING | Facility: HOSPITAL | Age: 35
End: 2025-02-28
Attending: OBSTETRICS & GYNECOLOGY
Payer: COMMERCIAL

## 2025-02-28 DIAGNOSIS — O26.90 PREGNANCY RELATED CONDITION, ANTEPARTUM: ICD-10-CM

## 2025-02-28 PROCEDURE — 76816 OB US FOLLOW-UP PER FETUS: CPT | Mod: 26 | Performed by: OBSTETRICS & GYNECOLOGY

## 2025-02-28 PROCEDURE — 76816 OB US FOLLOW-UP PER FETUS: CPT

## 2025-03-03 ENCOUNTER — LAB (OUTPATIENT)
Dept: LAB | Facility: CLINIC | Age: 35
End: 2025-03-03
Payer: COMMERCIAL

## 2025-03-03 DIAGNOSIS — D69.6 THROMBOCYTOPENIA: ICD-10-CM

## 2025-03-03 DIAGNOSIS — G40.909 SEIZURE DISORDER (H): ICD-10-CM

## 2025-03-03 DIAGNOSIS — Z3A.19 19 WEEKS GESTATION OF PREGNANCY: ICD-10-CM

## 2025-03-03 PROCEDURE — 36415 COLL VENOUS BLD VENIPUNCTURE: CPT

## 2025-03-03 PROCEDURE — 80177 DRUG SCRN QUAN LEVETIRACETAM: CPT

## 2025-03-03 PROCEDURE — 85049 AUTOMATED PLATELET COUNT: CPT

## 2025-03-04 LAB
LEVETIRACETAM SERPL-MCNC: 11 ÂΜG/ML (ref 10–40)
PLATELET # BLD AUTO: 90 10E3/UL (ref 150–450)

## 2025-03-13 NOTE — PROGRESS NOTES
"    [facility]  [facility]  [facility] [address], [address], [place] [address]  Main: [phone], Fax: [phone]    Video Virtual Visit Note:    Patient: Lauren Montague  MRN: [id number]  : 1990  VANGIE: 2025  Location of the patient when this video visit is conducted: Patient's home  Location of this writer at the time of this video visit is conducted: [facility], [facility].     Due to the ongoing COVID-19 outbreak, this visit was conducted by video, with the patient's approval.    Reason of today's visit:  Thrombocytopenia.      Clinical History Summary:  Lauren is a 34 year old  female with a history of seizures, who is under good control with Keppra, who also has a history of chronic thrombocytopenia as well as a family history of thrombocytopenia, currently at 31 weeks and 6 days gestation, participates in today's video visit to discuss her labs results in the past several months and to finalize her labor and delivery plan. Lauren also has a history of Myomectomy in the fundal area and thus her last (first) child was delivered via  on 2022.     Her thrombocytopenia history summary:  Lauren has a history of chronic thrombocytopenia with her platelet count at baseline around 120K. This is evidence on her historical records dated back to 2013 where her platelet count at the time was 120K. Between  to Dec 2021, her platelet count ranges from 110K to 131K.   Lauren underwent abdominal myomectomy back in .  Then she became pregnant with her first child back at the end of .   2022, she underwent \"repeat \" via her previous Myomectomy (large fundal fibroid) incision to deliver her baby at 41w1d. At the time, her platelet count was 64K and the decision was made to have her undergo general anesthesia. From what I can tell, there was no immediate bleeding " complications and she did not require any platelet transfusions though it was prepared. The  was uneventful and she was discharged on 2022 without any bleeding complications.  2024, she established care with this writer when she was pregnant at 19 weeks gestation with her current pregnancy. At the time the plan was to have her deliver the baby via repeat  at around 37-38 weeks gestation. Her Ob/Gyn, Dr. Crawford referred her to our clinic for consultation about her chronic thrombocytopenia. Her 10/10/2024 platelet count was 125K.   Lauren reported a family history of chronically low platelet counts including her father, brother, and paternal cousins. Both her father's and her brother's platelet count are reportedly at around 100K and they never experienced any issues with bleeding. One of her paternal cousins reportedly required general anesthesia for her  delivery due to low platelet in the past. Lauren's son (who is currently around 2 years of age, also reportedly was found to have mildly low platelet when they were tested at 1 years of age. Her son has no issues with bleeding diathesis.       Interim History:  2024, she established care with this writer. At the time, I proceeded with some additional labs including VWF profile, repeat CBC with differentials, peripheral blood smear morphology, platelet function screening test, platelet aggregation study and platelet electron microscopy. Basically all of these tests came back negative or within normal range. Her platelet count has remained around 90-120K in the past several months. This writer also provided with an antepartum monitoring plan as well as a plan at time of delivery. I was planning to see her back 1 month after she has delivered her child.     She returns today to discuss her lab results in more details. She denies any bleeding issues. She reports that they have to increase his Keppra dosing quite a bit during  this pregnancy.     She is currently scheduled to undergo repeat  delivery on 2025. She has a meeting with anesthesia about the plan on 2025 at the [facility].     ROS:  Denies any bleeding complications. Specifically, no frequent epistaxis. No issues with oral mucosal bleeding. Denies any hematuria or blood in stools. Denies any shortness of breath. No chest pain. No cough. No fever.      Medications:   Current Outpatient Medications   Medication Sig Dispense Refill    levETIRAcetam (KEPPRA) 500 MG tablet Take 1,500 mg by mouth daily.      levETIRAcetam (KEPPRA) 750 MG tablet 750 mg 2 times daily. (Patient not taking: Reported on 3/10/2025)      loratadine (CLARITIN) 10 MG tablet Take 10 mg by mouth daily      Prenatal Vit-Fe Fumarate-FA (PRENATAL MULTIVITAMIN W/IRON) 27-0.8 MG tablet Take 1 tablet by mouth daily      Vitamin D, Cholecalciferol, 25 MCG (1000 UT) CAPS Take 2,000 Units by mouth daily.       No current facility-administered medications for this visit.         Allergies:   Allergies   Allergen Reactions    Morphine Nausea and Vomiting    Penicillins Swelling and Other (See Comments)     PN: swelled up    Dust Mites Other (See Comments)    Cat Dander Unknown, Itching and Rash    Pollen Extract Itching and Rash        PMH:   Past Medical History:   Diagnosis Date    Motion sickness     PONV (postoperative nausea and vomiting)     Seizures (H)     Epilepsy  Last        Social History:   Deferred    Family History:  As described above.    Objective:  Visual Examination via Video:  Pleasant in no acute distress.  Normal work of breathing   A+O x 3    Labs:      Platelet count done on 3/25/2025 was 88K    Assessment:  In summary, Lauren is a 34 year old  female with a history of seizures, who is under good control with Keppra, who also has a history of chronic thrombocytopenia as well as a family history of thrombocytopenia, participating in today's video visit to discuss her lab  results done in the past several months and to finalize her labor and delivery plan.      As mentioned above, she has had chronic thrombocytopenia at least since  with her baseline platelet count around 120K. She has had no bleeding diathesis despite having her platelet count dropped down to the 60s at the time of her  delivery (under general anesthesia) of her first child back in . She also has no lifelong history of bleeding diathesis.      The differential diagnosis of thrombocytopenia is broad which include but not limited to:  Chronic ITP.   Congenital thrombocytopenia. Considering that she also has a family history of thrombocytopenia, this diagnosis is rather high on the list.   Type 2B Von Willebrand Disease. This is unlikely as she has had no history of bleeding diathesis.   Medication induced thrombocytopenia. As far as I understand, thrombocytopenia is not a common side effect of Keppra, thus I do not feel that Keppra is the cause of her chronic thrombocytopenia.   Pregnancy induced thrombocytopenia. This is also unlikely as she has had thrombocytopenia prior to her ever becoming pregnant.   Artificial platelet clumping such as reaction to EDTA within the blood collection tubes.   Giant platelet syndrome.      Diagnosis:  Chronic thrombocytopenia.  Family history of thrombocytopenia  Pregnant at 31 weeks 6 days weeks gestation.      Plan:  I explain to her that we have essentially ruled out overt intrinsic platelet dysfunction and Von Willebrand Disease. Again, she likely has either chronic ITP (unlikely) or she has congenital thrombocytopenia (more likely given family history).     For the remainder of her pregnancy, her hematological management plan is as follow:  Antepartum: She will start weekly platelet count monitoring starting tomorrow (32 weeks gestation).   Labor and delivery: As mentioned, she is currently scheduled to undergo repeat  delivery on 2025. I recommend  that she gets her platelet count done prior to neuraxial anesthesia placement. She is safe from our perspective to proceed with neuraxial anesthesia as long as her platelet count is >70K. If her platelet count is between 50K-70K, consider general anesthesia for  delivery and consider platelet transfusion with unexpected bleeding. If her platelet count is <50K, consider platelet transfusion prior to or concurrently during  delivery. Also consider inpatient hematology consult if her platelet count is <50K.   Post partum: Monitor for bleeding complications and platelet count daily until discharge. Follow up appointment with this writer 1-2 months post partum to discuss the need of further hematological testing and/or proceeding with genetic testing at that time.       Video-Visit Details:  Type of service:  Video Visit  Video Start Time:  11:36  Video End Time (time video stopped): 11:50  Originating Location (pt. Location): Home  Distant Location (provider location):  [facility]   Mode of Communication:  Video Conference via [employer]      Rinku Melton PA-C, MPAS  Physician Assistant  [facility].     The longitudinal plan of care for these conditions below were addressed during this visit. Due to the added complexity in care, I will continue to support Lauren Montague  in the subsequent management of these conditions and with the ongoing continuity of care of these conditions.    Problem List Items Addressed This Visit as of 2024   Chronic thrombocytopenia.  Family history of thrombocytopenia  Pregnant at 31 weeks 6 days weeks gestation.     30 minutes spent by me on the date of the encounter doing chart review, history and exam, documentation and further activities per the note

## 2025-03-25 ENCOUNTER — PRENATAL OFFICE VISIT (OUTPATIENT)
Dept: OBGYN | Facility: CLINIC | Age: 35
End: 2025-03-25
Payer: COMMERCIAL

## 2025-03-25 VITALS
HEART RATE: 89 BPM | SYSTOLIC BLOOD PRESSURE: 113 MMHG | DIASTOLIC BLOOD PRESSURE: 72 MMHG | WEIGHT: 224 LBS | OXYGEN SATURATION: 97 % | BODY MASS INDEX: 39.69 KG/M2

## 2025-03-25 DIAGNOSIS — D69.6 THROMBOCYTOPENIA: ICD-10-CM

## 2025-03-25 DIAGNOSIS — O09.529 ENCOUNTER FOR SUPERVISION OF MULTIGRAVIDA WITH ADVANCED MATERNAL AGE: Primary | ICD-10-CM

## 2025-03-25 LAB — PLATELET # BLD AUTO: 88 10E3/UL (ref 150–450)

## 2025-03-25 PROCEDURE — 3074F SYST BP LT 130 MM HG: CPT | Performed by: OBSTETRICS & GYNECOLOGY

## 2025-03-25 PROCEDURE — 0502F SUBSEQUENT PRENATAL CARE: CPT | Performed by: OBSTETRICS & GYNECOLOGY

## 2025-03-25 PROCEDURE — 99207 PR PRENATAL VISIT: CPT | Performed by: OBSTETRICS & GYNECOLOGY

## 2025-03-25 PROCEDURE — 3078F DIAST BP <80 MM HG: CPT | Performed by: OBSTETRICS & GYNECOLOGY

## 2025-03-25 PROCEDURE — 36415 COLL VENOUS BLD VENIPUNCTURE: CPT | Performed by: OBSTETRICS & GYNECOLOGY

## 2025-03-25 PROCEDURE — 85049 AUTOMATED PLATELET COUNT: CPT | Performed by: OBSTETRICS & GYNECOLOGY

## 2025-03-27 ENCOUNTER — VIRTUAL VISIT (OUTPATIENT)
Dept: HEMATOLOGY | Facility: CLINIC | Age: 35
End: 2025-03-27
Attending: PHYSICIAN ASSISTANT
Payer: COMMERCIAL

## 2025-03-27 ENCOUNTER — TELEPHONE (OUTPATIENT)
Dept: OBGYN | Facility: CLINIC | Age: 35
End: 2025-03-27

## 2025-03-27 DIAGNOSIS — Z3A.31 31 WEEKS GESTATION OF PREGNANCY: ICD-10-CM

## 2025-03-27 DIAGNOSIS — O99.119 THROMBOCYTOPENIA AFFECTING PREGNANCY: Primary | ICD-10-CM

## 2025-03-27 DIAGNOSIS — D69.6 THROMBOCYTOPENIA AFFECTING PREGNANCY: Primary | ICD-10-CM

## 2025-03-27 NOTE — TELEPHONE ENCOUNTER
pt calling in because she wanted to give an update that she has felt more movement since her call earlier.

## 2025-03-27 NOTE — PATIENT INSTRUCTIONS
Lauren,    It was nice to see you via video visit earlier today.    Below is a summary of our plan:  Antepartum: Starting tomorrow (32 weeks gestation) we will have you start weekly monitoring of your platelet count.   Labor and delivery: I will recommend that your Ob team to check your platelet count on the day of your scheduled  prior to anesthesia. If you platelet count is >70K, then I have no opposition for them to do epidural anesthesia. If your platelet count is between 50-70K, then they should consider general anesthesia for your  delivery. If your platelet count is <50K, they should consider giving you platelet transfusion.   Post partum: I am recommending that they monitor your platelet count daily immediately postpartum until you are discharged. They I will plan to see you back in clinic 4-6 weeks after your delivery to discuss the need for further testing and/or genetic testing.     If you should have any further questions or concerns, please call [phone] and ask to speak to a nursing staff.    Thank you once again in choosing our clinic as part of your healthcare team.      Rinku Melton PA-C, MPAS  Physician Assistant  [facility] for Ble[facility]ot[facility].

## 2025-03-27 NOTE — TELEPHONE ENCOUNTER
31w6d    Ptn calling with DFM concerns   Since lunch, normally super active after she eats  Was laying down for 2-3 minutes, did not feel anything then so she called   Has anterior placenta   Normal movements this AM  Patient is very tearful   Reviewed how to do kick counts. She is going to do that, feeling reassured by RN talking her through this.   Ptn will call back if she has any concerns (RN offered us to call and she said she will call)    Radha RHODES RN   [facility] Triage RN

## 2025-03-27 NOTE — TELEPHONE ENCOUNTER
Caller reporting the following red-flag symptom(s): Decreased fetal movement in pregnancy     Per the system red-flag symptom policy, patient was instructed to:  speak with a Registered Nurse    Action:  Patient warm transferred to a Registered Nurse

## 2025-03-28 NOTE — PROGRESS NOTES
31w4d  Doing well, but quite uncomfortable.  Baby feels very high.  Following with heme for thrombocytopenia.  Will check labs today and she has follow-up appointment coming up.  PAC consult placed for c/s, thrombocytopenia.  Patient is aware of general guidelines for regional vs GET based on platelet count.  Did get GET for her first .  S>D today.  Has growth scheduled with MFM.  RTC 2w and prn.    Natali Crawford MD

## 2025-04-01 NOTE — TELEPHONE ENCOUNTER
FUTURE VISIT INFORMATION      SURGERY INFORMATION:  Date: 25  Location:  L+D  Surgeon:  Natali Crawford MD  Anesthesia Type:  Spinal  Procedure: REPEAT  SECTION   Consult: 3/25/25    RECORDS REQUESTED FROM:       Primary Care Provider:  [facility]     Pertinent Medical History: Thrombocytopenia; Epilepsy

## 2025-04-02 NOTE — PROGRESS NOTES
A:    Secundigravida planning breastfeeding  Planning scheduled   birth due to history of myomectomy  Epilepsy on Keppra    P:    Discussed the importance of immediate breastfeeding and frequent skin to skin contact, definitely in the first 1-2 hours postpartum and in the first few days and week to support effective breastfeeding. Reviewed normal baby behaviors and cycles of alertness in first days.  Discussed potential issues with late  infants:  greater risk for hypoglycemia, jaundice, temperature regulation difficulties, need for NICU care.  Reinforced value of skin-to-skin and breastfeeding in managing all of these concerns.  Also discussed that early babies often tire more easily and are less effective with breastfeeding, so may require supplementation until closer to their due date.  Provided with tips on nursing late  infants.  Explained value of early and frequent hand expression and its relationship to improved latch and more confidence with breastfeeding, as well as potential value for baby if supplementation is needed. Demonstrated hand expression today, and given link to video on prenatal preparation for effective breastfeeding.  Discussed option of prenatal colostrum expression:  Lauren will consider.  Encouraged acceptance of help from others, to allow for lots of time spent holding baby and establishing breastfeeding. Encouraged limiting the time her baby is swaddled and being held by other family or friends, as this can overwhelm the baby and decrease the effectiveness of breastfeeding in the few days, a crucial time for establishing breastfeeding.  Reviewed techniques for attaining deep latch, and several different positions that can be used for comfortable nursing.  Discussed the potential role of nipple shield to assist with latch, but not recommended at this time.  Recommended light nipple stimulation to help with eversion.   Discussed how to tell if baby is getting  "enough milk and swallowing at breast; provided with link to video demonstrating good latch and swallowing, as well as discussing output goals.  Reassured that there is no need to track the number of minutes that baby nurses;  the number of minutes spent at the breast does not equal the amount of milk that they take.  Encouraged to watch baby for signs of active drinking--if they are swallowing regularly, there is no need to stop them, but if they have finished drinking and are just occasionally making sucking motions or dozing, you do not need to continue the feeding.  What the baby is doing at your breast is more important than how many minutes they are there. Eliminating keeping track of minutes spent nursing will often decrease both workload and stress.     Discussed supply/demand system of milk supply, and the importance of regular, frequent feedings to maintain good supply.  Reviewed normal pattern of early weight loss, and discussed that babies need to eat 8-12 times/day, although this is not always evenly distributed throughout the day.  Discussed role of breastpump and indications for using a breastpump postpartum, including if baby is not nursing effectively or is in need of supplementation. Lauren has a new Spectra pump and is considering a wearable as well.  Discussed how to properly use passive milk-collecting devices.   Recommended requesting a visit by IBCLC (lactation consultant) while on postpartum unit.   Reviewed management of engorgement and given written info  Discussed use of Keppra in breastfeeding:  reviewed that classification in Dr. Boyd Win's InfantRisk resource is Lactation Risk category:  \"L2,\" which corresponds to \"limited data, probably compatible.\"  Other comments are that infant plasma levels of Keppra are much higher during pregnancy than during breastfeeding.    Additionally, the Relative Infant Dose for Keppra (meaning the estimated amount of drug the baby receives in the milk) " "is 5.5%--safe is considered less than 10%.    Discussed outpatient support including community resources and [facility] outpatient lactation services--encouraged to set up early postpartum appt.        S:  Lauren is here for prenatal lactation consultation at 33 1/2 weeks gestation.  She is concerned because she will be giving birth prematurely, at 36w5d, and she also had a difficult early first experience:  first baby was very sleepy and had some initial slow weight gain requiring \"triple feeding\" for several weeks.  Laurne also recalls significant discomfort with engorgement and would like to review care for this. After initial weeks, baby did very well and continued breastfeeding without difficulty for one year.     She is planning a scheduled   at 36w 5 days due to history of myomectomy.  Has noticed breast changes in pregnancy including enlargement and darkening.     Relevant History:    Epilepsy managed with Keppra  Anxiety and depression  Myomectomy years ago, planning   No history of breast surgeries.   No hormonal disorders, such as thyroid problems, infertility, irregular cycles, diabetes    She has received Covid vaccination, with most recent dose 10/18/24    Breast exam:   Nipples: everted, with large drops of colostrum easily expressible  Breasts:  normal shape and spacing, good veining    LMP 2024   OB History    Para Term  AB Living   2 1 1 0 0 1   SAB IAB Ectopic Multiple Live Births   0 0 0 0 1      # Outcome Date GA Lbr [place]/2nd Weight Sex Type Anes PTL Lv   2 Current            1 Term 22 41w1d  3.629 kg (8 lb) M CS-LTranv Gen N LATISHA      Name: YANIV GONZALES      Apgar1: 10  Apgar5: 10     Past Medical History:   Diagnosis Date    Motion sickness     PONV (postoperative nausea and vomiting)     Seizures (H)     Epilepsy  Last      Past Surgical History:   Procedure Laterality Date     SECTION N/A 2022    Procedure:  " SECTION;  Surgeon: Yo Lindquist MD;  Location: [facility] OR    DILATION AND CURETTAGE, OPERATIVE HYSTEROSCOPY, COMBINED N/A 08/18/2021    Procedure: HYSTEROSCOPY, REMOVAL OF INTRAUTERINE DEVICE;  Surgeon: Yo Lindquist MD;  Location: [facility] OR    MYOMECTOMY ABDOMINAL APPROACH  2013    Large fundal fibroid.  Labor contraindicated     Family History   Problem Relation Age of Onset    No Known Problems Mother     No Known Problems Father     No Known Problems Maternal Grandmother     No Known Problems Maternal Grandfather     No Known Problems Paternal Grandmother     No Known Problems Paternal Grandfather     No Known Problems Brother     No Known Problems Sister     No Known Problems Son        Current Outpatient Medications:     levETIRAcetam (KEPPRA) 500 MG tablet, Take 1,500 mg by mouth daily., Disp: , Rfl:     levETIRAcetam (KEPPRA) 750 MG tablet, 750 mg 2 times daily. (Patient not taking: Reported on 3/25/2025), Disp: , Rfl:     loratadine (CLARITIN) 10 MG tablet, Take 10 mg by mouth daily, Disp: , Rfl:     Prenatal Vit-Fe Fumarate-FA (PRENATAL MULTIVITAMIN W/IRON) 27-0.8 MG tablet, Take 1 tablet by mouth daily, Disp: , Rfl:     Vitamin D, Cholecalciferol, 25 MCG (1000 UT) CAPS, Take 2,000 Units by mouth daily., Disp: , Rfl:         Time spent:    Face-to-face visit:  62 min  Documentation:  10 min  Total time spent on day of service:  72 min     Tereza Angulo, APRN, CNM, IBCLC

## 2025-04-09 ENCOUNTER — OFFICE VISIT (OUTPATIENT)
Dept: OBGYN | Facility: CLINIC | Age: 35
End: 2025-04-09
Payer: COMMERCIAL

## 2025-04-09 VITALS
DIASTOLIC BLOOD PRESSURE: 75 MMHG | SYSTOLIC BLOOD PRESSURE: 111 MMHG | WEIGHT: 223.7 LBS | BODY MASS INDEX: 39.64 KG/M2 | HEART RATE: 86 BPM

## 2025-04-09 DIAGNOSIS — Z91.89 AT RISK FOR BREASTFEEDING DIFFICULTY: ICD-10-CM

## 2025-04-09 DIAGNOSIS — Z71.89 ENCOUNTER FOR ANTEPARTUM CONSULTATION REGARDING LACTATION: Primary | ICD-10-CM

## 2025-04-09 PROCEDURE — 99215 OFFICE O/P EST HI 40 MIN: CPT | Performed by: ADVANCED PRACTICE MIDWIFE

## 2025-04-09 PROCEDURE — 3078F DIAST BP <80 MM HG: CPT | Performed by: ADVANCED PRACTICE MIDWIFE

## 2025-04-09 PROCEDURE — 99417 PROLNG OP E/M EACH 15 MIN: CPT | Performed by: ADVANCED PRACTICE MIDWIFE

## 2025-04-09 PROCEDURE — 3074F SYST BP LT 130 MM HG: CPT | Performed by: ADVANCED PRACTICE MIDWIFE

## 2025-04-09 NOTE — PATIENT INSTRUCTIONS
"  Immediate breastfeeding and frequent skin to skin contact, definitely in the first 1-2 hours postpartum and in the first few days and week, is very important to support effective breastfeeding and establish a good milk supply.   Babies born before 38 weeks are at greater risk for issues like low blood sugar, jaundice, temperature regulation difficulties, and/or need for NICU care.  Skin-to-skin contact and breastfeeding are helpful in managing all of these concerns.  It is also true, though, that early babies often tire more easily and are less effective with breastfeeding, so your baby may require supplementation until closer to their due date.  (See below tips on nursing early babies)  Early and frequent hand expression can be very helpful with helping to get baby latched well to the breast, developing confidence in breastfeeding, and possibly having some extra milk available if your baby needs this during the first days.  I It can be helpful to practice this a bit beforehand. (See attached video from [facility]).  You can begin collecting colostrum before baby comes, to be used if they need extra milk in the first few days--this is something you can consider.   Consider limiting the time baby is swaddled and being held by other family or friends, as this can overwhelm the baby and decrease the effectiveness of breastfeeding in the few days.  Allow your family, friends or other support people to do the household tasks and activities while you rest and care for your baby.  There are several different positions that can be used for comfortable nursing and helping baby latch deeply to the breast, including the cross-cradle, football and laid-back positions. It can be very helpful to use a pillow to elevate the baby a bit, and a footstool to help you be comfortable without having to \"hunch\" over the baby.  Nipple shields can be used to assist with latch if needed in certain situations, such as a baby who is " small and/or early, but this may not be necessary for you.  Try using just light nipple stimulation to help with bringing your nipples out a bit.    Regular, frequent feedings are important to maintain good supply, as milk production is a supply and demand system.  Babies need to eat 8 -12 times/day, although this is not always evenly distributed.  During the first week or so, you may need to awaken your baby for nursing, but once they are over their birthweight, you can allow them to direct their feeding schedule.   The main ways to tell if baby is getting enough milk are whether you can hear them swallowing when nursing, and their wet and dirty diapers--they should have about one wet diaper for each day of life through the first week (one wet diaper on the first day, two on the second day, etc), and their stools should be turning from dark black/brown to yellow by about day four.  See the attached video.  There is no need to track the number of minutes that your baby nurses;  the number of minutes that they spend at the breast does not equal the amount of milk that they take.  Just watch your baby for signs of active drinking--if they are swallowing regularly, there is no need to stop them, but if they have finished drinking and are just occasionally making sucking motions or dozing, you do not need to continue the feeding.  What the baby is doing at your breast is more important than how many minutes they are there. Eliminating keeping track of minutes spent nursing will often decrease both workload and stress.     Consider requesting a visit by an IBCLC (lactation consultant) while on postpartum unit.   A breast pump can be useful if your baby is having difficulty latching to the breast during the first days, if your baby is not nursing effectively, or if you have to be  from your baby, to help stimulate your breasts to produce a good milk supply.  Passive milk-collecting devices can be used after the  "first week or two if desired to collect some extra milk, but are not necessary, and should not be used routinely every feeding unless the milk is needed.  If you become very uncomfortably engorged after birth, you can manage this with regular ibuprofen, ice packs, or the reverse pressure softening or breast-lift techniques (see below).  Regarding use of Keppra in breastfeeding:  reviewed that classification in Dr. Boyd Win's [employer]ant[employer]Â®k resource is Lactation Risk category:  \"L2,\" which corresponds to \"limited data, probably compatible.\"  Other comments are that infant plasma levels of Keppra are much higher during pregnancy than during breastfeeding.    Additionally, the Relative Infant Dose for Keppra (meaning the estimated amount of drug the baby receives in the milk) is 5.5%--safe is considered less than 10%.    Support for breastfeeding after baby is born can include [facility] outpatient lactation services, as well as community resources such as Shona Herman and a variety of parenting support groups.  Consider setting up an early postpartum lactation appt to check baby's latch and weight gain, and deal with any concerns that have come up.      ____________    There is a really wonderful video that shows you how to establish a good milk supply and avoid nipple pain, by doing some practice at the end of pregnancy and then using your hands after the baby comes.  It shows you ways to get the baby latched on well and comfortably after birth--this is the main thing that helps to get a good milk supply established and avoid nipple pain.  You can find it here:  www.[email or web address].com.  Click on \"expecting a term baby.\"  I think you'll find it really helpful!  The whole site is great, actually, but start with that.  I also love the video on attachment, under \"ABC's.\"      ___________      This is a great video that shows a baby nursing well at the breast, and how to tell when baby is actively swallowing:  "     Is Your Baby Getting Enough Milk?    https://globalhealthmedia.org/videos/is-your-baby-getting-enough-milk/    _______________    Good breastfeeding resources:    Websites:  www.[employer]  www.[employer]/blog/  www.llli.org/breastfeeding-info/    Instagram:    [email or web address]  [email or web address]    Books:   The Womanly Art of Breastfeeding by [employer]  Breastfeeding Doesn't Need to Suck, by Bebe Richter      For help with using baby carriers:  https://babywearingtwincities.org/      ____________________________________________  Average Infant Milk Intake by Age    Age Average milk volume per feeding (mL) Average milk volume per feeding (oz) Average 24 hour milk intake (mL) Average 24 hour milk intake (oz)   Day 1 Few drops - 5mL < tsp Up to 30 mL Up to 1 oz   Day 2 5 - 15 mL <0.5 oz - 1 TB 30 - 120 mL 1 - 4 oz   Day 3 15 - 30 mL  0.5 - 1 oz 120 - 240 mL 4 - 8 oz   Day 4 30 - 45 mL  1 - 1.5 oz 240 - 360 mL 8 - 12 oz   Day 5-7 45 - 60 mL 1.5 - 2 oz 360 - 600 mL 12 - 18 oz   Week 2-3 60 - 90 mL 2 - 3 oz 450 - 750 mL 15 - 25 oz   Months 1-6 90 - 150 mL 3 - 5 oz 750 - 1035 mL 25 - 35 oz      _________________      Managing Breast Engorgement:    1.  Hand expression or reverse pressure softening may help just prior to feeding if your areola (Samish around your nipple) is firm.  Place your fingers on either side of the nipple.  Push gently but firmly straight inward toward your ribs.  Hold the pressure steady for 30-60 seconds.  Repeat with your fingers above and below the nipple.   This helps soften the area around your nipple so that the baby can latch more easily.    Good explanation and pictures of reverse pressure softening are here:    https://[employer]/bf/concerns/mother/rev_pressure_soft_laura/     2.  If your breasts are so full and painful that you are unable to latch baby at all, or unable to release any milk, you can also try the breast-lift technique: While lying down  "on your back, use both hands to lift your breasts up off of your chest wall and hold them there for several minutes. This can help the extra fluid drain from your breasts back into your general circulation and relieve the pressure and hardness of your breasts.   3.  Briefly hand expressing before or after nursing can also help to relieve some fullness and help baby latch better.    4. Ice packs after breastfeeding (for about 20 minutes) can be helpful to decrease swelling.  5. Use of an anti-inflammatory medication, such as ibuprofen (Motrin, Advil, etc) also reduces inflammation and may help to decrease your swelling.  6.  Breast engorgement will resolve on its own in a couple of days, so as long as you can get your baby to latch on and nurse frequently, you will soon be more comfortable.  7.  Call a lactation consultant if unable to latch baby on.   ________________    Breastfeeding Your Not-Quite-Ready-to-Be-Born Baby    Babies who are born a few weeks earlier than expected often have more struggles with breastfeeding for several reasons:  their muscles are not as fully developed, so they have a bit less strength and are more \"floppy;\" their cheeks have less fat in them, making it harder for them to maintain a strong suck;  they are sleepy and have less energy for breastfeeding.  All of these things make it difficult for these slightly-early babies to take in as much milk as they need.    Many babies will need supplementary bottles of pumped milk until they are strong enough to take in what they need on their own.  THIS IS EXHAUSTING.  Some ideas for making it easier:    Consider dividing the day into periods of time for nursing at the breast and times for bottlefeeding, so that each feeding is not a \"triple threat\" of feeding-supplementing-pumping.  Think about choosing daytime for nursing and nighttime for bottlefeeding.    During your planned nursing times:   --offer the breast as soon as baby shows signs of " "waking--don't wait until crying, or tire baby with diaper changes, etc.  Feed right away!    --It is important to follow your sleepy baby's cues--they tire easily.  Waking every 2-3 hours for feeding is often not helpful;  research has shown that feedings every 3-4 hours is enough for most early-born babies.  --If baby falls asleep while nursing, then try burping or changing diaper to help with waking.    --Feedings should not take longer than 30-40 minutes.  If you will be giving a bottle after nursing, spend about half of that time nursing, and save about half for the bottlefeeding so that baby does not become exhausted.  They need energy to finish their meal!  --You don't need to routinely offer a bottle or pump after every feeding during the \"nursing at the breast\" time.  Depending on how baby does at the breast, pump and offer a bottle every 4-6 hours.  --If you are using a nipple shield to help baby stay latched to the breast, this can often be weaned away a bit after the due date, once baby is doing well on the breast.    During planned bottlefeeding times:  --Use a team approach!  Mother should pump while partner feeds baby using a bottle.  Everyone is then awake at the same time, and everyone can return to sleep quickly.    --Be certain to pump at least 6 times/day, or up to 8 if baby is not removing milk well from the breast  --As baby matures and is doing better with the breast (check with lactation consultant for test weights), you can decrease bottlefeedings and move to using the breast at more feedings  --Baby will also become more awake as he or she grows, and instead of needing to be awakened at least every 4 hours, will wake on their own, eventually moving to a more \"grown up\" baby schedule of awakening sometimes every 2 hours to nurse.  This is normal and expected!       "

## 2025-04-11 ENCOUNTER — PRE VISIT (OUTPATIENT)
Dept: SURGERY | Facility: CLINIC | Age: 35
End: 2025-04-11

## 2025-04-11 ENCOUNTER — ANCILLARY PROCEDURE (OUTPATIENT)
Dept: ULTRASOUND IMAGING | Facility: HOSPITAL | Age: 35
End: 2025-04-11
Attending: STUDENT IN AN ORGANIZED HEALTH CARE EDUCATION/TRAINING PROGRAM
Payer: COMMERCIAL

## 2025-04-11 DIAGNOSIS — O99.353 SEIZURE DISORDER DURING PREGNANCY IN THIRD TRIMESTER (H): ICD-10-CM

## 2025-04-11 DIAGNOSIS — G40.909 SEIZURE DISORDER DURING PREGNANCY IN THIRD TRIMESTER (H): ICD-10-CM

## 2025-04-11 PROCEDURE — 76816 OB US FOLLOW-UP PER FETUS: CPT

## 2025-04-11 PROCEDURE — 76816 OB US FOLLOW-UP PER FETUS: CPT | Mod: 26 | Performed by: STUDENT IN AN ORGANIZED HEALTH CARE EDUCATION/TRAINING PROGRAM

## 2025-04-11 PROCEDURE — 99213 OFFICE O/P EST LOW 20 MIN: CPT | Mod: 25 | Performed by: STUDENT IN AN ORGANIZED HEALTH CARE EDUCATION/TRAINING PROGRAM

## 2025-04-14 ENCOUNTER — PRENATAL OFFICE VISIT (OUTPATIENT)
Dept: OBGYN | Facility: CLINIC | Age: 35
End: 2025-04-14
Payer: COMMERCIAL

## 2025-04-14 VITALS
OXYGEN SATURATION: 98 % | BODY MASS INDEX: 39.87 KG/M2 | SYSTOLIC BLOOD PRESSURE: 117 MMHG | DIASTOLIC BLOOD PRESSURE: 74 MMHG | WEIGHT: 225 LBS | HEART RATE: 90 BPM

## 2025-04-14 DIAGNOSIS — G40.909 SEIZURE DISORDER (H): ICD-10-CM

## 2025-04-14 DIAGNOSIS — O09.529 ENCOUNTER FOR SUPERVISION OF MULTIGRAVIDA WITH ADVANCED MATERNAL AGE: Primary | ICD-10-CM

## 2025-04-14 DIAGNOSIS — D69.6 THROMBOCYTOPENIA: ICD-10-CM

## 2025-04-14 PROCEDURE — 36415 COLL VENOUS BLD VENIPUNCTURE: CPT | Performed by: OBSTETRICS & GYNECOLOGY

## 2025-04-14 PROCEDURE — 3074F SYST BP LT 130 MM HG: CPT | Performed by: OBSTETRICS & GYNECOLOGY

## 2025-04-14 PROCEDURE — 3078F DIAST BP <80 MM HG: CPT | Performed by: OBSTETRICS & GYNECOLOGY

## 2025-04-14 PROCEDURE — 87653 STREP B DNA AMP PROBE: CPT | Performed by: OBSTETRICS & GYNECOLOGY

## 2025-04-14 PROCEDURE — 99207 PR PRENATAL VISIT: CPT | Performed by: OBSTETRICS & GYNECOLOGY

## 2025-04-14 PROCEDURE — 0502F SUBSEQUENT PRENATAL CARE: CPT | Performed by: OBSTETRICS & GYNECOLOGY

## 2025-04-14 PROCEDURE — 85049 AUTOMATED PLATELET COUNT: CPT | Performed by: OBSTETRICS & GYNECOLOGY

## 2025-04-14 PROCEDURE — 80177 DRUG SCRN QUAN LEVETIRACETAM: CPT | Performed by: OBSTETRICS & GYNECOLOGY

## 2025-04-14 NOTE — PROGRESS NOTES
34w3d  Feeling very pregnant these days, but doing well.  Feels like baby dropped.  Scheduled for RLTCS 36+w due to history of large fundal myomectomy.  Discussed late  steroids, as well as rationale for it and some data showing potential for some more long term concerns with it.  Given there are no fetal concerns with this pregnancy (only maternal hx of myomectomy and maternal thrombocytopenia) mutually agreed to forego this intervention.  Has been checking platelets through heme.  Fairly stable around 90.  Repeat from today is in progress.  Aware of cut offs for regional anesthesia  Getting rec for pp Keppra dosing from neurologist.  Collected GBS today.  Aware she will get pre-op labs AND repeat platelets on day of procedure.  RTC 2w.  Natali Crawford MD

## 2025-04-15 LAB
GP B STREP DNA SPEC QL NAA+PROBE: NEGATIVE
LEVETIRACETAM SERPL-MCNC: 12.6 ÂΜG/ML (ref 10–40)
PLATELET # BLD AUTO: 79 10E3/UL (ref 150–450)

## 2025-04-16 ENCOUNTER — TELEPHONE (OUTPATIENT)
Dept: HEMATOLOGY | Facility: CLINIC | Age: 35
End: 2025-04-16
Payer: COMMERCIAL

## 2025-04-16 NOTE — TELEPHONE ENCOUNTER
[facility]  [facility]  [address] [place] [address], [address], [place] [address]  Main: [phone], Fax: [phone]    Telephone Note:    Patient: Lauren Montague  MRN: [id number]  : 1990  Date of this note written: 2025    This writer call the patient on 2025 at around 09:00am to communicate her platelet count result done yesterday (4/15/2025). Her platelet count yesterday was 79K. Lauren supposed to get her platelet count done weekly and her last platelet count was done on 3/25/2025. She reports that she has some difficulty scheduling her labs due to personal reasons.     In speaking with her, she is having an pre-anesthesia clinic visit tomorrow to discuss upcoming labor and delivery plan for which she is currently scheduled for a  delivery on 2025 here at the [facility].    She is planning to get a repeat PLT count done next week and the following week prior to 2025. No change in her hematological management plan as outlined in my note on 3/27/2025.       Rinku Melton PA-C, MPAS  Physician Assistant  [facility].

## 2025-04-17 ENCOUNTER — PRE VISIT (OUTPATIENT)
Dept: SURGERY | Facility: CLINIC | Age: 35
End: 2025-04-17

## 2025-04-17 ENCOUNTER — OFFICE VISIT (OUTPATIENT)
Dept: SURGERY | Facility: CLINIC | Age: 35
End: 2025-04-17
Attending: OBSTETRICS & GYNECOLOGY
Payer: COMMERCIAL

## 2025-04-17 VITALS
TEMPERATURE: 97.8 F | BODY MASS INDEX: 41.04 KG/M2 | HEIGHT: 62 IN | HEART RATE: 81 BPM | OXYGEN SATURATION: 97 % | RESPIRATION RATE: 16 BRPM | WEIGHT: 223 LBS | SYSTOLIC BLOOD PRESSURE: 120 MMHG | DIASTOLIC BLOOD PRESSURE: 83 MMHG

## 2025-04-17 DIAGNOSIS — D69.6 THROMBOCYTOPENIA, UNSPECIFIED: ICD-10-CM

## 2025-04-17 DIAGNOSIS — O09.93 HIGH-RISK PREGNANCY IN THIRD TRIMESTER: Primary | ICD-10-CM

## 2025-04-17 RX ORDER — FOLIC ACID 1 MG/1
1 TABLET ORAL 2 TIMES DAILY
COMMUNITY

## 2025-04-17 ASSESSMENT — PAIN SCALES - GENERAL: PAINLEVEL_OUTOF10: NO PAIN (0)

## 2025-04-17 ASSESSMENT — LIFESTYLE VARIABLES: TOBACCO_USE: 0

## 2025-04-17 ASSESSMENT — ENCOUNTER SYMPTOMS: SEIZURES: 1

## 2025-04-17 NOTE — CONSULTS
"  Anesthesia Consult Note      Reason for consult:   High Risk OB consult  (O09.93) High-risk pregnancy in third trimester  (primary encounter diagnosis)    (D69.6) Thrombocytopenia      Date of Encounter: 2025  Referring Physician: Natali Crawford MD  Primary Care Physician:  No Ref-Primary, Physician      HPI  Lauren Montague is a 34 year old woman who is currently  who is due on 5/3/25. She is being seen in our clinic for high risk OB consult due to thrombocytopenia. The patient has an OB history significant for: prior .    Patient was seen by Rinku Melton PA-C in hematology for her chronic thrombocytopenia. Per note dated 3/27/25, \"we have essentially ruled out overt intrinsic platelet dysfunction and Von Willebrand Disease. Again, she likely has either chronic ITP (unlikely) or she has congenital thrombocytopenia (more likely given family history).    For the remainder of her pregnancy, her hematological management plan is as follow:  Antepartum: She will start weekly platelet count monitoring starting tomorrow (32 weeks gestation).   Labor and delivery: As mentioned, she is currently scheduled to undergo repeat  delivery on 2025. I recommend that she gets her platelet count done prior to neuraxial anesthesia placement. She is safe from our perspective to proceed with neuraxial anesthesia as long as her platelet count is >70K. If her platelet count is between 50K-70K, consider general anesthesia for  delivery and consider platelet transfusion with unexpected bleeding. If her platelet count is <50K, consider platelet transfusion prior to or concurrently during  delivery. Also consider inpatient hematology consult if her platelet count is <50K.   Post partum: Monitor for bleeding complications and platelet count daily until discharge. Follow up appointment with this writer 1-2 months post partum to discuss the need of further hematological testing and/or " "proceeding with genetic testing at that time. \"      PMH is also significant for epilepsy (well controlled), anxiety, depression, PONV.    OB Hx:       /parity:      History of complications of pregnancy  No previous pregnancy complications or first pregnancy    History of obstetrical surgery  History of uterine surgery 2nd to h/o large fundal myomectomy in .  S/p      History of bleeding/coagulopathy  Chronic thrombocytopenia    History of anesthesia issues in patient or 1st degree relative  No previous issues with anesthesia for the patient or a first degree relative    History is obtained from the patient and chart review. She is accompanied by her .    Past Medical History  Past Medical History:   Diagnosis Date    Motion sickness     PONV (postoperative nausea and vomiting)     Seizures (H)     Epilepsy  Last        Past Surgical History  Past Surgical History:   Procedure Laterality Date     SECTION N/A 2022    Procedure:  SECTION;  Surgeon: Yo Lindquist MD;  Location: [facility]    DILATION AND CURETTAGE, OPERATIVE HYSTEROSCOPY, COMBINED N/A 2021    Procedure: HYSTEROSCOPY, REMOVAL OF INTRAUTERINE DEVICE;  Surgeon: Yo Lindquist MD;  Location: [facility]    MYOMECTOMY ABDOMINAL APPROACH      Large fundal fibroid.  Labor contraindicated       Prior to Admission Medications  Current Outpatient Medications   Medication Sig Dispense Refill    folic acid (FOLVITE) 1 MG tablet Take 1 mg by mouth 2 times daily.      levETIRAcetam (KEPPRA) 500 MG tablet Take 1,500 mg by mouth 2 times daily.      loratadine (CLARITIN) 10 MG tablet Take 10 mg by mouth every morning.      Prenatal Vit-Fe Fumarate-FA (PRENATAL MULTIVITAMIN W/IRON) 27-0.8 MG tablet Take 1 tablet by mouth every morning.      Vitamin D, Cholecalciferol, 25 MCG (1000 UT) CAPS Take 2,000 Units by mouth every morning.         Menstrual history: " Patient's last menstrual period was 08/16/2024.:      Allergies  Allergies   Allergen Reactions    Morphine Nausea and Vomiting    Penicillins Swelling and Other (See Comments)     PN: swelled up    Dust Mites Other (See Comments)    Cat Dander Unknown, Itching and Rash    Pollen Extract Itching and Rash       Social History  Social History     Socioeconomic History    Marital status:      Spouse name: Not on file    Number of children: Not on file    Years of education: Not on file    Highest education level: Not on file   Occupational History    Not on file   Tobacco Use    Smoking status: Never     Passive exposure: Never    Smokeless tobacco: Never   Vaping Use    Vaping status: Never Used   Substance and Sexual Activity    Alcohol use: Not Currently    Drug use: Never    Sexual activity: Yes     Partners: Male     Birth control/protection: None   Other Topics Concern    Not on file   Social History Narrative    Not on file     Social Drivers of Health     Financial Resource Strain: Not on file   Food Insecurity: Not on file   Transportation Needs: Not on file   Physical Activity: Not on file   Stress: Not on file   Social Connections: Not on file   Interpersonal Safety: Not on file   Housing Stability: Not on file       Family History  Family History   Problem Relation Age of Onset    No Known Problems Mother     No Known Problems Father     No Known Problems Maternal Grandmother     No Known Problems Maternal Grandfather     No Known Problems Paternal Grandmother     No Known Problems Paternal Grandfather     No Known Problems Brother     No Known Problems Sister     No Known Problems Son        The complete review of systems is negative other than noted in the HPI or here.     Anesthesia Evaluation   Pt has had prior anesthetic.     No history of anesthetic complications       ROS/MED HX  ENT/Pulmonary:     (+)           allergic rhinitis,                          (-) tobacco use, asthma, sleep apnea  "and recent URI   Neurologic:     (+)       seizures (well controlled on Keppra), last seizure: 2015,                     (-) no CVA   Cardiovascular:       METS/Exercise Tolerance: >4 METS    Hematologic:  - neg hematologic  ROS  (-) history of blood clots and history of blood transfusion   Musculoskeletal:  - neg musculoskeletal ROS     GI/Hepatic:     (+) GERD (pregnancy induced),                (-) liver disease   Renal/Genitourinary: Comment: S/p large fundal myomectomy in 2013.     (-) renal disease   Endo:     (+)               Obesity,    (-) Type II DM   Psychiatric/Substance Use:  - neg psychiatric ROS     Infectious Disease:  - neg infectious disease ROS     Malignancy:  - neg malignancy ROS     Other:  - neg other ROS            /83 (BP Location: Right arm, Patient Position: Sitting, Cuff Size: Adult Large)   Pulse 81   Temp 97.8  F (36.6  C) (Oral)   Resp 16   Ht 1.575 m (5' 2\")   Wt 101.2 kg (223 lb)   LMP 08/16/2024   SpO2 97%   BMI 40.79 kg/m      Physical Exam  Constitutional: Awake, alert, cooperative, no apparent distress, and appears stated age.  Eyes: Pupils equal, round and reactive to light, extra ocular muscles intact, sclera clear, conjunctiva normal.  HENT: Normocephalic, oral pharynx with moist mucus membranes, good dentition. No goiter appreciated. No removable dental hardware.  Respiratory: Clear to auscultation bilaterally, no crackles or wheezing. No SOB when supine.  Cardiovascular: Regular rate and rhythm, normal S1 and S2, and no murmur noted.  Carotids +2, no bruits. No edema. Palpable pulses to radial, DP and PT arteries.   GI: Consistent with 3rd trimester pregnancy.    Lymph/Hematologic: No cervical lymphadenopathy and no supraclavicular lymphadenopathy.  Genitourinary:  deferred  Skin: Warm and dry.  No rashes.   Musculoskeletal: full ROM of neck. There is no redness, warmth, or swelling of the joints. Gross motor strength is normal.    Neurologic: Awake, alert, " "oriented to name, place and time. Cranial nerves II-XII are grossly intact. Gait is normal. Ambulates from chair to exam table, seats self, lies supine and sits back up w/o assistance.  Neuropsychiatric: Calm, cooperative. Normal affect. Pleasant. Answers questions appropriately, follows commands w/o difficulty.      All pertinent records, results, labs and imaging personally reviewed        Assessment    Lauren Montague is a 34 year old female seen as a PAC referral for risk assessment and optimization for anesthesia.    Plan/Recommendations  Pt will be optimized for the proposed procedure.  See below for details on the assessment, risk, and preoperative recommendations    NEUROLOGY  - No history of TIA, CVA or seizure    -Post Op delirium risk factors:  No risk identified    ENT  - No current airway concerns.  Will need to be reassessed day of surgery.  Mallampati: III  TM: > 3    CARDIAC  - No history of CAD, Hypertension, and Afib  - METS (Metabolic Equivalents)  Patient performs 4 or more METS exercise without symptoms             Total Score: 0      RCRI-Low risk: Class 2 0.9% complication rate             Total Score: 1    RCRI: High Risk Surgery        PULMONARY  HENRY Low Risk             Total Score: 1    HENRY: BMI over 35 kg/m2      - Denies asthma or inhaler use  - Tobacco History    History   Smoking Status    Never   Smokeless Tobacco    Never       GI  - Pregnancy induced GERD  PONV High Risk  Total Score: 3           1 AN PONV: Pt is Female    1 AN PONV: Patient is not a current smoker    1 AN PONV: Patient has history of PONV          ENDOCRINE   - BMI: Estimated body mass index is 40.79 kg/m  as calculated from the following:    Height as of this encounter: 1.575 m (5' 2\").    Weight as of this encounter: 101.2 kg (223 lb).  Obesity (BMI >30)  - No history of Diabetes Mellitus    HEME  VTE Low Risk 0.26%             Total Score: 0      - Chronic thrombocytopenia. Seen by Rinku Melton PA-C in hematology " "(see HPI for more details).    For the remainder of her pregnancy, her hematological management plan is as follow:  Antepartum: She will start weekly platelet count monitoring starting tomorrow (32 weeks gestation).   Labor and delivery: As mentioned, she is currently scheduled to undergo repeat  delivery on 2025. I recommend that she gets her platelet count done prior to neuraxial anesthesia placement. She is safe from our perspective to proceed with neuraxial anesthesia as long as her platelet count is >70K. If her platelet count is between 50K-70K, consider general anesthesia for  delivery and consider platelet transfusion with unexpected bleeding. If her platelet count is <50K, consider platelet transfusion prior to or concurrently during  delivery. Also consider inpatient hematology consult if her platelet count is <50K.   Post partum: Monitor for bleeding complications and platelet count daily until discharge. Follow up appointment with this writer 1-2 months post partum to discuss the need of further hematological testing and/or proceeding with genetic testing at that time. \"    MSK  Patient is NOT Frail             Total Score: 0          OB/ GYN  - , 34w6d  - Repeat  scheduled 25  - s/p two uterine surgeries:  large fundal myomectomy in  and   .    PSYCH  - Patient express anxiety regarding upcoming delivery due to traumatic emotional experience with prior delivery.      On the day of service:     Prep time: 15 minutes  Visit time: 22 minutes  Documentation time: 16 minutes  ------------------------------------------  Total time: 53 minutes    Susan Martin PA-C    Preoperative Assessment Center  [facility]  Office phone: [phone]  Fax: [phone]    "

## 2025-04-21 ENCOUNTER — LAB (OUTPATIENT)
Dept: LAB | Facility: CLINIC | Age: 35
End: 2025-04-21
Payer: COMMERCIAL

## 2025-04-21 DIAGNOSIS — D69.6 THROMBOCYTOPENIA: ICD-10-CM

## 2025-04-21 LAB — PLATELET # BLD AUTO: 71 10E3/UL (ref 150–450)

## 2025-04-21 PROCEDURE — 85049 AUTOMATED PLATELET COUNT: CPT

## 2025-04-21 PROCEDURE — 36415 COLL VENOUS BLD VENIPUNCTURE: CPT

## 2025-04-22 ENCOUNTER — MEDICAL CORRESPONDENCE (OUTPATIENT)
Dept: HEALTH INFORMATION MANAGEMENT | Facility: CLINIC | Age: 35
End: 2025-04-22
Payer: COMMERCIAL

## 2025-04-23 DIAGNOSIS — O34.219 PREVIOUS CESAREAN DELIVERY, ANTEPARTUM CONDITION OR COMPLICATION: ICD-10-CM

## 2025-04-23 DIAGNOSIS — Z01.818 PRE-OP EXAM: Primary | ICD-10-CM

## 2025-04-27 LAB
ABO + RH BLD: NORMAL
BLD GP AB SCN SERPL QL: NEGATIVE
SPECIMEN EXP DATE BLD: NORMAL

## 2025-04-28 ENCOUNTER — PRENATAL OFFICE VISIT (OUTPATIENT)
Dept: OBGYN | Facility: CLINIC | Age: 35
End: 2025-04-28
Payer: COMMERCIAL

## 2025-04-28 VITALS
SYSTOLIC BLOOD PRESSURE: 125 MMHG | WEIGHT: 224.3 LBS | OXYGEN SATURATION: 97 % | BODY MASS INDEX: 41.03 KG/M2 | DIASTOLIC BLOOD PRESSURE: 79 MMHG | HEART RATE: 82 BPM

## 2025-04-28 DIAGNOSIS — Z98.890 HISTORY OF MYOMECTOMY: ICD-10-CM

## 2025-04-28 DIAGNOSIS — O34.219 PREVIOUS CESAREAN DELIVERY, ANTEPARTUM CONDITION OR COMPLICATION: ICD-10-CM

## 2025-04-28 DIAGNOSIS — Z01.818 PRE-OP EXAM: ICD-10-CM

## 2025-04-28 DIAGNOSIS — O09.93 HIGH-RISK PREGNANCY IN THIRD TRIMESTER: Primary | ICD-10-CM

## 2025-04-28 PROCEDURE — 0502F SUBSEQUENT PRENATAL CARE: CPT | Performed by: OBSTETRICS & GYNECOLOGY

## 2025-04-28 PROCEDURE — 85027 COMPLETE CBC AUTOMATED: CPT | Performed by: OBSTETRICS & GYNECOLOGY

## 2025-04-28 PROCEDURE — 3078F DIAST BP <80 MM HG: CPT | Performed by: OBSTETRICS & GYNECOLOGY

## 2025-04-28 PROCEDURE — 99207 PR PRENATAL VISIT: CPT | Performed by: OBSTETRICS & GYNECOLOGY

## 2025-04-28 PROCEDURE — 3074F SYST BP LT 130 MM HG: CPT | Performed by: OBSTETRICS & GYNECOLOGY

## 2025-04-28 PROCEDURE — 36415 COLL VENOUS BLD VENIPUNCTURE: CPT | Performed by: OBSTETRICS & GYNECOLOGY

## 2025-04-28 PROCEDURE — 86780 TREPONEMA PALLIDUM: CPT | Performed by: OBSTETRICS & GYNECOLOGY

## 2025-04-28 PROCEDURE — 86901 BLOOD TYPING SEROLOGIC RH(D): CPT | Performed by: OBSTETRICS & GYNECOLOGY

## 2025-04-28 PROCEDURE — 86850 RBC ANTIBODY SCREEN: CPT | Performed by: OBSTETRICS & GYNECOLOGY

## 2025-04-28 PROCEDURE — 86900 BLOOD TYPING SEROLOGIC ABO: CPT | Performed by: OBSTETRICS & GYNECOLOGY

## 2025-04-28 NOTE — PROGRESS NOTES
36w3d  Getting more uncomfortable and ready to have baby.  No s/sx of labor at this point.    Last platelet level 71, so anticipating needing GET.  Last delivery was traumatic  (Came in for post-dates IOL and was told she shouldn't labor.  Needed c/s under general, which was a lot to process in short period of time.  Didn't feel she was well informed about what was going on and why she had to stay in hospital for monitoring overnight until c/s the next day.)  Pre-op labs today and will need platelets rechecked day of admit.  Will bring some music to play quietly as she's going to sleep.    Dad was in room when baby was born, over by warmer, which would be preferred again.  Prefer to wait to weigh baby until Mom is present and awake.  If needs to be done before then for medical reasons, please don't tell mom.  Would like it to be done in front of her.    Therapy scheduled a week after delivery.      Hx of seizures:  per neuro (sent for scanning, not yet in chart) Decrease Keppra to 1000mg PO BID 1w pp and Keppra level 1w after that.  Also, draw Keppra level while still in the hospital    Natali Crawford MD

## 2025-04-29 ENCOUNTER — ANESTHESIA EVENT (OUTPATIENT)
Dept: OBGYN | Facility: CLINIC | Age: 35
End: 2025-04-29
Payer: COMMERCIAL

## 2025-04-29 LAB
ERYTHROCYTE [DISTWIDTH] IN BLOOD BY AUTOMATED COUNT: 13.2 % (ref 10–15)
HCT VFR BLD AUTO: 40.5 % (ref 35–47)
HGB BLD-MCNC: 13.6 G/DL (ref 11.7–15.7)
MCH RBC QN AUTO: 28.3 PG (ref 26.5–33)
MCHC RBC AUTO-ENTMCNC: 33.6 G/DL (ref 31.5–36.5)
MCV RBC AUTO: 84 FL (ref 78–100)
PLATELET # BLD AUTO: 76 10E3/UL (ref 150–450)
RBC # BLD AUTO: 4.81 10E6/UL (ref 3.8–5.2)
T PALLIDUM AB SER QL: NONREACTIVE
WBC # BLD AUTO: 11.6 10E3/UL (ref 4–11)

## 2025-04-29 RX ORDER — APREPITANT 40 MG/1
40 CAPSULE ORAL ONCE
Status: CANCELLED | OUTPATIENT
Start: 2025-04-29 | End: 2025-04-29

## 2025-04-29 ASSESSMENT — ENCOUNTER SYMPTOMS: SEIZURES: 1

## 2025-04-29 NOTE — ANESTHESIA PREPROCEDURE EVALUATION
"Anesthesia Pre-Procedure Evaluation    Patient: Lauren Montague   MRN: [id number] : 1990        Procedure : Procedure(s):  REPEAT  SECTION          Past Medical History:   Diagnosis Date    Motion sickness     PONV (postoperative nausea and vomiting)     Seizures (H)     Epilepsy  Last       Past Surgical History:   Procedure Laterality Date     SECTION N/A 2022    Procedure:  SECTION;  Surgeon: Yo Lindquist MD;  Location: [facility] OR    DILATION AND CURETTAGE, OPERATIVE HYSTEROSCOPY, COMBINED N/A 2021    Procedure: HYSTEROSCOPY, REMOVAL OF INTRAUTERINE DEVICE;  Surgeon: Yo Lindquist MD;  Location: [facility] OR    MYOMECTOMY ABDOMINAL APPROACH  2013    Large fundal fibroid.  Labor contraindicated      Allergies   Allergen Reactions    Morphine Nausea and Vomiting    Penicillins Swelling and Other (See Comments)     PN: swelled up    Dust Mites Other (See Comments)    Cat Dander Unknown, Itching and Rash    Pollen Extract Itching and Rash      Social History     Tobacco Use    Smoking status: Never     Passive exposure: Never    Smokeless tobacco: Never   Substance Use Topics    Alcohol use: Not Currently      Wt Readings from Last 1 Encounters:   25 101.7 kg (224 lb 4.8 oz)        Anesthesia Evaluation   Pt has had prior anesthetic. Type: General.    History of anesthetic complications  - PONV.      ROS/MED HX  ENT/Pulmonary:  - neg pulmonary ROS     Neurologic:     (+)       seizures (Well controlled on Keppra),                         Cardiovascular:  - neg cardiovascular ROS     METS/Exercise Tolerance:     Hematologic: Comments: Thrombocytopenia - per heme \"ruled out overt platelet dysfunction and vWD. Likely either chronic ITP or congenital thrombocytopenia (more likely given family history)    (+)    thrombocytopenia,            Musculoskeletal:       GI/Hepatic:     (+) GERD,                   Renal/Genitourinary:       Endo: " "    (+)               Obesity,       Psychiatric/Substance Use:     (+) psychiatric history anxiety and depression       Infectious Disease:       Malignancy:       Other:    @ 36w4d  Prev c/s d/t hx myomectomy (2013) (had large 17cm fundal fibroid). Required GA for thrombocytopenia.   (+)  , ,previous   (-) TOLAC candidate       Physical Exam    Airway  airway exam normal      Mallampati: III   TM distance: > 3 FB   Neck ROM: full   Mouth opening: > 3 cm    Respiratory Devices and Support         Dental       (+) Minor Abnormalities - some fillings, tiny chips      Cardiovascular   cardiovascular exam normal       Rhythm and rate: regular and normal     Pulmonary   pulmonary exam normal        breath sounds clear to auscultation           OUTSIDE LABS:  CBC:   Lab Results   Component Value Date    WBC 11.0 2025    WBC 9.7 2024    HGB 13.2 2025    HGB 13.3 2025    HCT 39.2 2025    HCT 38.8 2024    PLT 71 (L) 2025    PLT 79 (L) 2025     BMP: No results found for: \"NA\", \"POTASSIUM\", \"CHLORIDE\", \"CO2\", \"BUN\", \"CR\", \"GLC\"  COAGS:   Lab Results   Component Value Date    PTT 26 2022    INR 1.06 2022     POC:   Lab Results   Component Value Date    HCG Negative 2021     HEPATIC: No results found for: \"ALBUMIN\", \"PROTTOTAL\", \"ALT\", \"AST\", \"GGT\", \"ALKPHOS\", \"BILITOTAL\", \"BILIDIRECT\", \"MANDI\"  OTHER:   Lab Results   Component Value Date    A1C 5.2 2025       Anesthesia Plan    ASA Status:  3       Anesthesia Type: Spinal, General.              Consents    Anesthesia Plan(s) and associated risks, benefits, and realistic alternatives discussed. Questions answered and patient/representative(s) expressed understanding.     - Discussed:     - Discussed with:  Patient            Postoperative Care            Comments:               Oscar CASSIDY Ma, MD    Clinically Significant Risk Factors Present on Admission                                    "

## 2025-04-30 ENCOUNTER — HOSPITAL ENCOUNTER (INPATIENT)
Facility: CLINIC | Age: 35
End: 2025-04-30
Attending: OBSTETRICS & GYNECOLOGY | Admitting: OBSTETRICS & GYNECOLOGY
Payer: COMMERCIAL

## 2025-04-30 ENCOUNTER — ANESTHESIA (OUTPATIENT)
Dept: OBGYN | Facility: CLINIC | Age: 35
End: 2025-04-30
Payer: COMMERCIAL

## 2025-04-30 DIAGNOSIS — Z98.891 S/P CESAREAN SECTION: Primary | ICD-10-CM

## 2025-04-30 LAB
CF REDUC 60M P MA LENFR BLD TEG: 0 % (ref 0–15)
CFT BLD TEG: 1.7 MINUTE (ref 1–3)
CI (COAGULATION INDEX)(Z) NON NATIVE: 1.5 (ref -3–3)
CLOT ANGLE BLD TEG: 65.5 DEGREES (ref 53–72)
CLOT INIT BLD TEG: 5.4 MINUTE (ref 5–10)
CLOT INIT KAOL IND TO POST HEP NEUT TRTO: 1 {RATIO}
CLOT INIT KAOLIN IND BLD US: 136 SEC (ref 113–166)
CLOT INIT KAOLIN IND P HEP NEUT BLD US: 133 SEC (ref 103–153)
CLOT LYSIS 30M P MA LENFR BLD TEG: 0 % (ref 0–8)
CLOT STIFF PLT CONT BLD CALC: 11.4 HPA (ref 11.9–29.8)
CLOT STIFF TF IND P HEP NEUT BLD US: 14.6 HPA (ref 13–33.2)
CLOT STIFF TF IND+IIB-IIIA INH P HEP NEU: 3.2 HPA (ref 1–3.7)
CLOT STRENGTH BLD TEG: 11.3 KD/SC (ref 4.5–11)
ERYTHROCYTE [DISTWIDTH] IN BLOOD BY AUTOMATED COUNT: 13.4 % (ref 10–15)
HCT VFR BLD AUTO: 38.5 % (ref 35–47)
HGB BLD-MCNC: 13.2 G/DL (ref 11.7–15.7)
MCF BLD TEG: 69.3 MM (ref 50–70)
MCH RBC QN AUTO: 28.7 PG (ref 26.5–33)
MCHC RBC AUTO-ENTMCNC: 34.3 G/DL (ref 31.5–36.5)
MCV RBC AUTO: 84 FL (ref 78–100)
PLATELET # BLD AUTO: 69 10E3/UL (ref 150–450)
RBC # BLD AUTO: 4.6 10E6/UL (ref 3.8–5.2)
WBC # BLD AUTO: 10 10E3/UL (ref 4–11)

## 2025-04-30 PROCEDURE — 59510 CESAREAN DELIVERY: CPT | Mod: GC | Performed by: OBSTETRICS & GYNECOLOGY

## 2025-04-30 PROCEDURE — 250N000009 HC RX 250: Performed by: OBSTETRICS & GYNECOLOGY

## 2025-04-30 PROCEDURE — 250N000013 HC RX MED GY IP 250 OP 250 PS 637

## 2025-04-30 PROCEDURE — 85396 CLOTTING ASSAY WHOLE BLOOD: CPT

## 2025-04-30 PROCEDURE — 250N000013 HC RX MED GY IP 250 OP 250 PS 637: Performed by: OBSTETRICS & GYNECOLOGY

## 2025-04-30 PROCEDURE — 999N000016 HC STATISTIC ATTENDANCE AT DELIVERY

## 2025-04-30 PROCEDURE — 999N000141 HC STATISTIC PRE-PROCEDURE NURSING ASSESSMENT: Performed by: OBSTETRICS & GYNECOLOGY

## 2025-04-30 PROCEDURE — 258N000003 HC RX IP 258 OP 636

## 2025-04-30 PROCEDURE — 250N000009 HC RX 250

## 2025-04-30 PROCEDURE — 370N000017 HC ANESTHESIA TECHNICAL FEE, PER MIN: Performed by: OBSTETRICS & GYNECOLOGY

## 2025-04-30 PROCEDURE — 360N000076 HC SURGERY LEVEL 3, PER MIN: Performed by: OBSTETRICS & GYNECOLOGY

## 2025-04-30 PROCEDURE — 85396 CLOTTING ASSAY WHOLE BLOOD: CPT | Performed by: STUDENT IN AN ORGANIZED HEALTH CARE EDUCATION/TRAINING PROGRAM

## 2025-04-30 PROCEDURE — 36415 COLL VENOUS BLD VENIPUNCTURE: CPT | Performed by: STUDENT IN AN ORGANIZED HEALTH CARE EDUCATION/TRAINING PROGRAM

## 2025-04-30 PROCEDURE — 250N000011 HC RX IP 250 OP 636: Mod: JZ

## 2025-04-30 PROCEDURE — 250N000011 HC RX IP 250 OP 636

## 2025-04-30 PROCEDURE — 120N000002 HC R&B MED SURG/OB UMMC

## 2025-04-30 PROCEDURE — 85014 HEMATOCRIT: CPT

## 2025-04-30 PROCEDURE — 258N000003 HC RX IP 258 OP 636: Performed by: OBSTETRICS & GYNECOLOGY

## 2025-04-30 PROCEDURE — 710N000009 HC RECOVERY PHASE 1, LEVEL 1, PER MIN: Performed by: OBSTETRICS & GYNECOLOGY

## 2025-04-30 PROCEDURE — 271N000001 HC OR GENERAL SUPPLY NON-STERILE: Performed by: OBSTETRICS & GYNECOLOGY

## 2025-04-30 PROCEDURE — 272N000001 HC OR GENERAL SUPPLY STERILE: Performed by: OBSTETRICS & GYNECOLOGY

## 2025-04-30 RX ORDER — LOPERAMIDE HYDROCHLORIDE 2 MG/1
4 CAPSULE ORAL
Status: DISCONTINUED | OUTPATIENT
Start: 2025-04-30 | End: 2025-04-30

## 2025-04-30 RX ORDER — OXYCODONE HYDROCHLORIDE 5 MG/1
5 TABLET ORAL
Status: DISCONTINUED | OUTPATIENT
Start: 2025-04-30 | End: 2025-05-02 | Stop reason: HOSPADM

## 2025-04-30 RX ORDER — NALOXONE HYDROCHLORIDE 0.4 MG/ML
0.4 INJECTION, SOLUTION INTRAMUSCULAR; INTRAVENOUS; SUBCUTANEOUS
Status: DISCONTINUED | OUTPATIENT
Start: 2025-04-30 | End: 2025-04-30

## 2025-04-30 RX ORDER — SODIUM CHLORIDE, SODIUM LACTATE, POTASSIUM CHLORIDE, CALCIUM CHLORIDE 600; 310; 30; 20 MG/100ML; MG/100ML; MG/100ML; MG/100ML
INJECTION, SOLUTION INTRAVENOUS CONTINUOUS PRN
Status: DISCONTINUED | OUTPATIENT
Start: 2025-04-30 | End: 2025-04-30

## 2025-04-30 RX ORDER — ONDANSETRON 4 MG/1
4 TABLET, ORALLY DISINTEGRATING ORAL EVERY 6 HOURS PRN
Status: DISCONTINUED | OUTPATIENT
Start: 2025-04-30 | End: 2025-05-02 | Stop reason: HOSPADM

## 2025-04-30 RX ORDER — ACETAMINOPHEN 325 MG/1
975 TABLET ORAL EVERY 6 HOURS
Status: DISCONTINUED | OUTPATIENT
Start: 2025-04-30 | End: 2025-05-02 | Stop reason: HOSPADM

## 2025-04-30 RX ORDER — IBUPROFEN 800 MG/1
800 TABLET, FILM COATED ORAL EVERY 6 HOURS
Status: DISCONTINUED | OUTPATIENT
Start: 2025-04-30 | End: 2025-05-02 | Stop reason: HOSPADM

## 2025-04-30 RX ORDER — TRANEXAMIC ACID 10 MG/ML
1 INJECTION, SOLUTION INTRAVENOUS EVERY 30 MIN PRN
Status: DISCONTINUED | OUTPATIENT
Start: 2025-04-30 | End: 2025-04-30

## 2025-04-30 RX ORDER — OXYTOCIN/0.9 % SODIUM CHLORIDE 30/500 ML
PLASTIC BAG, INJECTION (ML) INTRAVENOUS CONTINUOUS PRN
Status: DISCONTINUED | OUTPATIENT
Start: 2025-04-30 | End: 2025-04-30

## 2025-04-30 RX ORDER — BUPIVACAINE HYDROCHLORIDE 2.5 MG/ML
INJECTION, SOLUTION EPIDURAL; INFILTRATION; INTRACAUDAL; PERINEURAL
Status: COMPLETED | OUTPATIENT
Start: 2025-04-30 | End: 2025-04-30

## 2025-04-30 RX ORDER — METHYLERGONOVINE MALEATE 0.2 MG/ML
200 INJECTION INTRAVENOUS
Status: DISCONTINUED | OUTPATIENT
Start: 2025-04-30 | End: 2025-04-30

## 2025-04-30 RX ORDER — AMOXICILLIN 250 MG
2 CAPSULE ORAL 2 TIMES DAILY
Status: DISCONTINUED | OUTPATIENT
Start: 2025-04-30 | End: 2025-05-02 | Stop reason: HOSPADM

## 2025-04-30 RX ORDER — BISACODYL 10 MG
10 SUPPOSITORY, RECTAL RECTAL DAILY PRN
Status: DISCONTINUED | OUTPATIENT
Start: 2025-05-02 | End: 2025-05-02 | Stop reason: HOSPADM

## 2025-04-30 RX ORDER — OXYCODONE HYDROCHLORIDE 5 MG/1
5 TABLET ORAL EVERY 4 HOURS PRN
Status: DISCONTINUED | OUTPATIENT
Start: 2025-04-30 | End: 2025-05-02 | Stop reason: HOSPADM

## 2025-04-30 RX ORDER — ONDANSETRON 2 MG/ML
4 INJECTION INTRAMUSCULAR; INTRAVENOUS EVERY 30 MIN PRN
Status: DISCONTINUED | OUTPATIENT
Start: 2025-04-30 | End: 2025-05-02 | Stop reason: HOSPADM

## 2025-04-30 RX ORDER — MISOPROSTOL 200 UG/1
800 TABLET ORAL
Status: DISCONTINUED | OUTPATIENT
Start: 2025-04-30 | End: 2025-04-30

## 2025-04-30 RX ORDER — FLUMAZENIL 0.1 MG/ML
0.2 INJECTION, SOLUTION INTRAVENOUS
Status: DISCONTINUED | OUTPATIENT
Start: 2025-04-30 | End: 2025-04-30

## 2025-04-30 RX ORDER — NALOXONE HYDROCHLORIDE 0.4 MG/ML
0.2 INJECTION, SOLUTION INTRAMUSCULAR; INTRAVENOUS; SUBCUTANEOUS
Status: DISCONTINUED | OUTPATIENT
Start: 2025-04-30 | End: 2025-04-30

## 2025-04-30 RX ORDER — OXYTOCIN/0.9 % SODIUM CHLORIDE 30/500 ML
340 PLASTIC BAG, INJECTION (ML) INTRAVENOUS CONTINUOUS PRN
Status: DISCONTINUED | OUTPATIENT
Start: 2025-04-30 | End: 2025-04-30

## 2025-04-30 RX ORDER — OXYTOCIN 10 [USP'U]/ML
10 INJECTION, SOLUTION INTRAMUSCULAR; INTRAVENOUS
Status: DISCONTINUED | OUTPATIENT
Start: 2025-04-30 | End: 2025-05-02 | Stop reason: HOSPADM

## 2025-04-30 RX ORDER — KETOROLAC TROMETHAMINE 30 MG/ML
INJECTION, SOLUTION INTRAMUSCULAR; INTRAVENOUS PRN
Status: DISCONTINUED | OUTPATIENT
Start: 2025-04-30 | End: 2025-04-30

## 2025-04-30 RX ORDER — LIDOCAINE 40 MG/G
CREAM TOPICAL
Status: DISCONTINUED | OUTPATIENT
Start: 2025-04-30 | End: 2025-05-02 | Stop reason: HOSPADM

## 2025-04-30 RX ORDER — MISOPROSTOL 200 UG/1
400 TABLET ORAL
Status: DISCONTINUED | OUTPATIENT
Start: 2025-04-30 | End: 2025-05-02 | Stop reason: HOSPADM

## 2025-04-30 RX ORDER — EPHEDRINE SULFATE 50 MG/ML
INJECTION, SOLUTION INTRAMUSCULAR; INTRAVENOUS; SUBCUTANEOUS PRN
Status: DISCONTINUED | OUTPATIENT
Start: 2025-04-30 | End: 2025-04-30

## 2025-04-30 RX ORDER — SODIUM CHLORIDE, SODIUM LACTATE, POTASSIUM CHLORIDE, CALCIUM CHLORIDE 600; 310; 30; 20 MG/100ML; MG/100ML; MG/100ML; MG/100ML
INJECTION, SOLUTION INTRAVENOUS CONTINUOUS
Status: DISCONTINUED | OUTPATIENT
Start: 2025-04-30 | End: 2025-04-30

## 2025-04-30 RX ORDER — HYDROCORTISONE 25 MG/G
CREAM TOPICAL 3 TIMES DAILY PRN
Status: DISCONTINUED | OUTPATIENT
Start: 2025-04-30 | End: 2025-05-02 | Stop reason: HOSPADM

## 2025-04-30 RX ORDER — LIDOCAINE 40 MG/G
CREAM TOPICAL
Status: DISCONTINUED | OUTPATIENT
Start: 2025-04-30 | End: 2025-04-30

## 2025-04-30 RX ORDER — KETOROLAC TROMETHAMINE 15 MG/ML
15 INJECTION, SOLUTION INTRAMUSCULAR; INTRAVENOUS EVERY 6 HOURS
Status: DISCONTINUED | OUTPATIENT
Start: 2025-04-30 | End: 2025-04-30

## 2025-04-30 RX ORDER — LEVETIRACETAM 750 MG/1
1500 TABLET ORAL 2 TIMES DAILY
Status: DISCONTINUED | OUTPATIENT
Start: 2025-04-30 | End: 2025-05-02 | Stop reason: HOSPADM

## 2025-04-30 RX ORDER — AMOXICILLIN 250 MG
1 CAPSULE ORAL 2 TIMES DAILY
Status: DISCONTINUED | OUTPATIENT
Start: 2025-04-30 | End: 2025-05-02 | Stop reason: HOSPADM

## 2025-04-30 RX ORDER — CLINDAMYCIN PHOSPHATE 900 MG/50ML
900 INJECTION, SOLUTION INTRAVENOUS ONCE
Status: COMPLETED | OUTPATIENT
Start: 2025-04-30 | End: 2025-04-30

## 2025-04-30 RX ORDER — MISOPROSTOL 200 UG/1
800 TABLET ORAL
Status: DISCONTINUED | OUTPATIENT
Start: 2025-04-30 | End: 2025-05-02 | Stop reason: HOSPADM

## 2025-04-30 RX ORDER — DIPHENHYDRAMINE HYDROCHLORIDE 50 MG/ML
25 INJECTION, SOLUTION INTRAMUSCULAR; INTRAVENOUS EVERY 6 HOURS PRN
Status: DISCONTINUED | OUTPATIENT
Start: 2025-04-30 | End: 2025-05-02 | Stop reason: HOSPADM

## 2025-04-30 RX ORDER — FENTANYL CITRATE 50 UG/ML
INJECTION, SOLUTION INTRAMUSCULAR; INTRAVENOUS
Status: COMPLETED | OUTPATIENT
Start: 2025-04-30 | End: 2025-04-30

## 2025-04-30 RX ORDER — CARBOPROST TROMETHAMINE 250 UG/ML
250 INJECTION, SOLUTION INTRAMUSCULAR
Status: DISCONTINUED | OUTPATIENT
Start: 2025-04-30 | End: 2025-04-30

## 2025-04-30 RX ORDER — ONDANSETRON 4 MG/1
4 TABLET, ORALLY DISINTEGRATING ORAL EVERY 30 MIN PRN
Status: DISCONTINUED | OUTPATIENT
Start: 2025-04-30 | End: 2025-05-02 | Stop reason: HOSPADM

## 2025-04-30 RX ORDER — METOCLOPRAMIDE HYDROCHLORIDE 5 MG/ML
10 INJECTION INTRAMUSCULAR; INTRAVENOUS EVERY 6 HOURS PRN
Status: DISCONTINUED | OUTPATIENT
Start: 2025-04-30 | End: 2025-05-02 | Stop reason: HOSPADM

## 2025-04-30 RX ORDER — OXYTOCIN 10 [USP'U]/ML
10 INJECTION, SOLUTION INTRAMUSCULAR; INTRAVENOUS
Status: DISCONTINUED | OUTPATIENT
Start: 2025-04-30 | End: 2025-04-30

## 2025-04-30 RX ORDER — METHYLERGONOVINE MALEATE 0.2 MG/ML
200 INJECTION INTRAVENOUS
Status: DISCONTINUED | OUTPATIENT
Start: 2025-04-30 | End: 2025-05-02 | Stop reason: HOSPADM

## 2025-04-30 RX ORDER — DIPHENHYDRAMINE HCL 25 MG
25 CAPSULE ORAL EVERY 6 HOURS PRN
Status: DISCONTINUED | OUTPATIENT
Start: 2025-04-30 | End: 2025-05-02 | Stop reason: HOSPADM

## 2025-04-30 RX ORDER — PROCHLORPERAZINE MALEATE 10 MG
10 TABLET ORAL EVERY 6 HOURS PRN
Status: DISCONTINUED | OUTPATIENT
Start: 2025-04-30 | End: 2025-05-02 | Stop reason: HOSPADM

## 2025-04-30 RX ORDER — SODIUM PHOSPHATE,MONO-DIBASIC 19G-7G/118
1 ENEMA (ML) RECTAL DAILY PRN
Status: DISCONTINUED | OUTPATIENT
Start: 2025-05-02 | End: 2025-05-02 | Stop reason: HOSPADM

## 2025-04-30 RX ORDER — DEXTROSE, SODIUM CHLORIDE, SODIUM LACTATE, POTASSIUM CHLORIDE, AND CALCIUM CHLORIDE 5; .6; .31; .03; .02 G/100ML; G/100ML; G/100ML; G/100ML; G/100ML
INJECTION, SOLUTION INTRAVENOUS CONTINUOUS
Status: DISCONTINUED | OUTPATIENT
Start: 2025-04-30 | End: 2025-05-02 | Stop reason: HOSPADM

## 2025-04-30 RX ORDER — OXYCODONE HYDROCHLORIDE 5 MG/1
10 TABLET ORAL
Status: DISCONTINUED | OUTPATIENT
Start: 2025-04-30 | End: 2025-05-02 | Stop reason: HOSPADM

## 2025-04-30 RX ORDER — BUPIVACAINE HYDROCHLORIDE 7.5 MG/ML
INJECTION, SOLUTION INTRASPINAL
Status: COMPLETED | OUTPATIENT
Start: 2025-04-30 | End: 2025-04-30

## 2025-04-30 RX ORDER — DEXAMETHASONE SODIUM PHOSPHATE 4 MG/ML
INJECTION, SOLUTION INTRA-ARTICULAR; INTRALESIONAL; INTRAMUSCULAR; INTRAVENOUS; SOFT TISSUE PRN
Status: DISCONTINUED | OUTPATIENT
Start: 2025-04-30 | End: 2025-04-30

## 2025-04-30 RX ORDER — LOPERAMIDE HYDROCHLORIDE 2 MG/1
4 CAPSULE ORAL
Status: DISCONTINUED | OUTPATIENT
Start: 2025-04-30 | End: 2025-05-02 | Stop reason: HOSPADM

## 2025-04-30 RX ORDER — LOPERAMIDE HYDROCHLORIDE 2 MG/1
2 CAPSULE ORAL
Status: DISCONTINUED | OUTPATIENT
Start: 2025-04-30 | End: 2025-05-02 | Stop reason: HOSPADM

## 2025-04-30 RX ORDER — ONDANSETRON 2 MG/ML
INJECTION INTRAMUSCULAR; INTRAVENOUS PRN
Status: DISCONTINUED | OUTPATIENT
Start: 2025-04-30 | End: 2025-04-30

## 2025-04-30 RX ORDER — NALOXONE HYDROCHLORIDE 0.4 MG/ML
0.1 INJECTION, SOLUTION INTRAMUSCULAR; INTRAVENOUS; SUBCUTANEOUS
Status: DISCONTINUED | OUTPATIENT
Start: 2025-04-30 | End: 2025-05-02 | Stop reason: HOSPADM

## 2025-04-30 RX ORDER — MORPHINE SULFATE 1 MG/ML
INJECTION, SOLUTION EPIDURAL; INTRATHECAL; INTRAVENOUS
Status: COMPLETED | OUTPATIENT
Start: 2025-04-30 | End: 2025-04-30

## 2025-04-30 RX ORDER — CEFAZOLIN SODIUM/WATER 2 G/20 ML
2 SYRINGE (ML) INTRAVENOUS SEE ADMIN INSTRUCTIONS
Status: DISCONTINUED | OUTPATIENT
Start: 2025-04-30 | End: 2025-04-30

## 2025-04-30 RX ORDER — OXYTOCIN/0.9 % SODIUM CHLORIDE 30/500 ML
340 PLASTIC BAG, INJECTION (ML) INTRAVENOUS CONTINUOUS PRN
Status: DISCONTINUED | OUTPATIENT
Start: 2025-04-30 | End: 2025-05-02 | Stop reason: HOSPADM

## 2025-04-30 RX ORDER — CITRIC ACID/SODIUM CITRATE 334-500MG
30 SOLUTION, ORAL ORAL
Status: COMPLETED | OUTPATIENT
Start: 2025-04-30 | End: 2025-04-30

## 2025-04-30 RX ORDER — CEFAZOLIN SODIUM/WATER 2 G/20 ML
2 SYRINGE (ML) INTRAVENOUS
Status: DISCONTINUED | OUTPATIENT
Start: 2025-04-30 | End: 2025-04-30

## 2025-04-30 RX ORDER — ACETAMINOPHEN 325 MG/1
975 TABLET ORAL ONCE
Status: DISCONTINUED | OUTPATIENT
Start: 2025-04-30 | End: 2025-04-30

## 2025-04-30 RX ORDER — CARBOPROST TROMETHAMINE 250 UG/ML
250 INJECTION, SOLUTION INTRAMUSCULAR
Status: DISCONTINUED | OUTPATIENT
Start: 2025-04-30 | End: 2025-05-02 | Stop reason: HOSPADM

## 2025-04-30 RX ORDER — MISOPROSTOL 200 UG/1
400 TABLET ORAL
Status: DISCONTINUED | OUTPATIENT
Start: 2025-04-30 | End: 2025-04-30

## 2025-04-30 RX ORDER — IBUPROFEN 800 MG/1
800 TABLET, FILM COATED ORAL EVERY 6 HOURS
Status: DISCONTINUED | OUTPATIENT
Start: 2025-05-01 | End: 2025-04-30

## 2025-04-30 RX ORDER — ACETAMINOPHEN 325 MG/1
975 TABLET ORAL ONCE
Status: COMPLETED | OUTPATIENT
Start: 2025-04-30 | End: 2025-04-30

## 2025-04-30 RX ORDER — FENTANYL CITRATE 50 UG/ML
25-50 INJECTION, SOLUTION INTRAMUSCULAR; INTRAVENOUS
Status: DISCONTINUED | OUTPATIENT
Start: 2025-04-30 | End: 2025-04-30

## 2025-04-30 RX ORDER — TRANEXAMIC ACID 10 MG/ML
1 INJECTION, SOLUTION INTRAVENOUS EVERY 30 MIN PRN
Status: DISCONTINUED | OUTPATIENT
Start: 2025-04-30 | End: 2025-05-02 | Stop reason: HOSPADM

## 2025-04-30 RX ORDER — SIMETHICONE 80 MG
80 TABLET,CHEWABLE ORAL 4 TIMES DAILY PRN
Status: DISCONTINUED | OUTPATIENT
Start: 2025-04-30 | End: 2025-05-02 | Stop reason: HOSPADM

## 2025-04-30 RX ORDER — OXYTOCIN/0.9 % SODIUM CHLORIDE 30/500 ML
100-340 PLASTIC BAG, INJECTION (ML) INTRAVENOUS CONTINUOUS PRN
Status: DISCONTINUED | OUTPATIENT
Start: 2025-04-30 | End: 2025-05-02 | Stop reason: HOSPADM

## 2025-04-30 RX ORDER — DEXAMETHASONE SODIUM PHOSPHATE 4 MG/ML
4 INJECTION, SOLUTION INTRA-ARTICULAR; INTRALESIONAL; INTRAMUSCULAR; INTRAVENOUS; SOFT TISSUE
Status: DISCONTINUED | OUTPATIENT
Start: 2025-04-30 | End: 2025-05-02 | Stop reason: HOSPADM

## 2025-04-30 RX ORDER — LOPERAMIDE HYDROCHLORIDE 2 MG/1
2 CAPSULE ORAL
Status: DISCONTINUED | OUTPATIENT
Start: 2025-04-30 | End: 2025-04-30

## 2025-04-30 RX ORDER — METOCLOPRAMIDE 10 MG/1
10 TABLET ORAL EVERY 6 HOURS PRN
Status: DISCONTINUED | OUTPATIENT
Start: 2025-04-30 | End: 2025-05-02 | Stop reason: HOSPADM

## 2025-04-30 RX ORDER — ONDANSETRON 2 MG/ML
4 INJECTION INTRAMUSCULAR; INTRAVENOUS EVERY 6 HOURS PRN
Status: DISCONTINUED | OUTPATIENT
Start: 2025-04-30 | End: 2025-05-02 | Stop reason: HOSPADM

## 2025-04-30 RX ADMIN — PHENYLEPHRINE HYDROCHLORIDE 200 MCG: 10 INJECTION INTRAVENOUS at 09:03

## 2025-04-30 RX ADMIN — BUPIVACAINE 20 ML: 13.3 INJECTION, SUSPENSION, LIPOSOMAL INFILTRATION at 10:25

## 2025-04-30 RX ADMIN — PHENYLEPHRINE HYDROCHLORIDE 100 MCG/MIN: 10 INJECTION INTRAVENOUS at 08:59

## 2025-04-30 RX ADMIN — DEXAMETHASONE SODIUM PHOSPHATE 8 MG: 4 INJECTION, SOLUTION INTRAMUSCULAR; INTRAVENOUS at 09:37

## 2025-04-30 RX ADMIN — BUPIVACAINE HYDROCHLORIDE IN DEXTROSE 1.6 ML: 7.5 INJECTION, SOLUTION SUBARACHNOID at 09:13

## 2025-04-30 RX ADMIN — EPHEDRINE SULFATE 5 MG: 5 INJECTION INTRAVENOUS at 09:03

## 2025-04-30 RX ADMIN — DIPHENHYDRAMINE HYDROCHLORIDE 25 MG: 25 CAPSULE ORAL at 21:25

## 2025-04-30 RX ADMIN — FENTANYL CITRATE 15 MCG: 50 INJECTION INTRAMUSCULAR; INTRAVENOUS at 09:13

## 2025-04-30 RX ADMIN — ONDANSETRON 4 MG: 2 INJECTION INTRAMUSCULAR; INTRAVENOUS at 09:00

## 2025-04-30 RX ADMIN — ACETAMINOPHEN 975 MG: 325 TABLET ORAL at 07:29

## 2025-04-30 RX ADMIN — SODIUM CHLORIDE, SODIUM LACTATE, POTASSIUM CHLORIDE, AND CALCIUM CHLORIDE 500 ML: .6; .31; .03; .02 INJECTION, SOLUTION INTRAVENOUS at 07:28

## 2025-04-30 RX ADMIN — MORPHINE SULFATE 0.15 MG: 1 INJECTION EPIDURAL; INTRATHECAL; INTRAVENOUS at 09:13

## 2025-04-30 RX ADMIN — PHENYLEPHRINE HYDROCHLORIDE 100 MCG: 10 INJECTION INTRAVENOUS at 09:07

## 2025-04-30 RX ADMIN — EPHEDRINE SULFATE 10 MG: 5 INJECTION INTRAVENOUS at 10:12

## 2025-04-30 RX ADMIN — LEVETIRACETAM 1500 MG: 750 TABLET, FILM COATED ORAL at 14:15

## 2025-04-30 RX ADMIN — SENNOSIDES AND DOCUSATE SODIUM 1 TABLET: 50; 8.6 TABLET ORAL at 13:03

## 2025-04-30 RX ADMIN — SENNOSIDES AND DOCUSATE SODIUM 1 TABLET: 50; 8.6 TABLET ORAL at 19:39

## 2025-04-30 RX ADMIN — EPHEDRINE SULFATE 5 MG: 5 INJECTION INTRAVENOUS at 10:08

## 2025-04-30 RX ADMIN — KETOROLAC TROMETHAMINE 30 MG: 30 INJECTION, SOLUTION INTRAMUSCULAR at 10:04

## 2025-04-30 RX ADMIN — LEVETIRACETAM 1500 MG: 750 TABLET, FILM COATED ORAL at 21:03

## 2025-04-30 RX ADMIN — CLINDAMYCIN PHOSPHATE 900 MG: 900 INJECTION, SOLUTION INTRAVENOUS at 08:58

## 2025-04-30 RX ADMIN — BUPIVACAINE HYDROCHLORIDE 20 ML: 2.5 INJECTION, SOLUTION EPIDURAL; INFILTRATION; INTRACAUDAL at 10:25

## 2025-04-30 RX ADMIN — GENTAMICIN SULFATE 200 MG: 40 INJECTION, SOLUTION INTRAMUSCULAR; INTRAVENOUS at 08:39

## 2025-04-30 RX ADMIN — Medication 600 ML/HR: at 09:24

## 2025-04-30 RX ADMIN — IBUPROFEN 800 MG: 800 TABLET, FILM COATED ORAL at 22:56

## 2025-04-30 RX ADMIN — PHENYLEPHRINE HYDROCHLORIDE 200 MCG: 10 INJECTION INTRAVENOUS at 09:05

## 2025-04-30 RX ADMIN — SODIUM CHLORIDE, SODIUM LACTATE, POTASSIUM CHLORIDE, AND CALCIUM CHLORIDE: .6; .31; .03; .02 INJECTION, SOLUTION INTRAVENOUS at 08:50

## 2025-04-30 RX ADMIN — ACETAMINOPHEN 975 MG: 325 TABLET ORAL at 19:40

## 2025-04-30 RX ADMIN — KETOROLAC TROMETHAMINE 15 MG: 15 INJECTION, SOLUTION INTRAMUSCULAR; INTRAVENOUS at 17:06

## 2025-04-30 RX ADMIN — SIMETHICONE 80 MG: 80 TABLET, CHEWABLE ORAL at 19:40

## 2025-04-30 RX ADMIN — SODIUM CITRATE AND CITRIC ACID MONOHYDRATE 30 ML: 500; 334 SOLUTION ORAL at 08:48

## 2025-04-30 RX ADMIN — TRANEXAMIC ACID 1 G: 10 INJECTION, SOLUTION INTRAVENOUS at 09:32

## 2025-04-30 RX ADMIN — ACETAMINOPHEN 975 MG: 325 TABLET ORAL at 13:02

## 2025-04-30 RX ADMIN — EPHEDRINE SULFATE 5 MG: 5 INJECTION INTRAVENOUS at 09:53

## 2025-04-30 ASSESSMENT — ACTIVITIES OF DAILY LIVING (ADL)
ADLS_ACUITY_SCORE: 16
ADLS_ACUITY_SCORE: 18
ADLS_ACUITY_SCORE: 18
ADLS_ACUITY_SCORE: 25
ADLS_ACUITY_SCORE: 16
ADLS_ACUITY_SCORE: 16
ADLS_ACUITY_SCORE: 19
ADLS_ACUITY_SCORE: 16
ADLS_ACUITY_SCORE: 19
ADLS_ACUITY_SCORE: 19
ADLS_ACUITY_SCORE: 25
ADLS_ACUITY_SCORE: 19
ADLS_ACUITY_SCORE: 16
ADLS_ACUITY_SCORE: 19

## 2025-04-30 NOTE — ANESTHESIA PROCEDURE NOTES
TAP Procedure Note    Pre-Procedure   Staff -        Anesthesiologist:  Raven Urban MD       Resident/Fellow: Oscar Palacios MD       Other Anesthesia Staff: Paxton Flynn MD       Performed By: resident and with residents       Procedure performed by resident/fellow/CRNA in presence of a teaching physician.         Location: OB       Procedure Start/Stop Times: 4/30/2025 10:20 AM and 4/30/2025 10:24 AM       Pre-Anesthestic Checklist: patient identified, IV checked, site marked, risks and benefits discussed, informed consent, monitors and equipment checked, pre-op evaluation, at physician/surgeon's request and post-op pain management  Timeout:       Correct Patient: Yes        Correct Procedure: Yes        Correct Site: Yes        Correct Position: Yes        Correct Laterality: Yes        Site Marked: Yes  Procedure Documentation  Procedure: TAP         Laterality: bilateral       Patient Position: supine       Skin prep: Chloraprep       Insertion Site: T9-10.       Needle Type: insulated and short bevel       Needle Gauge: 20.        Needle Length (millimeters): 110        Ultrasound guided       1. Ultrasound was used to identify targeted nerve, plexus, vascular marker, or fascial plane and place a needle adjacent to it in real-time.       2. Ultrasound was used to visualize the spread of anesthetic in close proximity to the above referenced structure.       3. A permanent image is entered into the patient's record.       4. The visualized anatomic structures appeared normal.       5. There were no apparent abnormal pathologic findings.    Assessment/Narrative         The placement was negative for: blood aspirated, painful injection and site bleeding       Paresthesias: No.       Insertion/Infusion Method: Single Shot       Complications: none       Injection made incrementally with aspirations every 5 mL.    Medication(s) Administered   Bupivacaine 0.25% PF (Infiltration) - Infiltration   20 mL -  "4/30/2025 10:25:00 AM  Bupivacaine liposome (Exparel) 1.3% LA inj susp (Infiltration) - Infiltration   20 mL - 4/30/2025 10:25:00 AM  Medication Administration Time: 4/30/2025 10:20 AM      FOR [facility] ([facility] ONLY:   Pain Team Contact information: please page the Pain Team Via [employer]. Search \"Pain\". During daytime hours, please page the attending first. At night please page the resident first.      "

## 2025-04-30 NOTE — PLAN OF CARE
Data: Patient admitted to room triage 2 at 0639. Patient is a . Prenatal record reviewed.   OB History    Para Term  AB Living   2 1 1 0 0 1   SAB IAB Ectopic Multiple Live Births   0 0 0 0 1      # Outcome Date GA Lbr [place]/2nd Weight Sex Type Anes PTL Lv   2 Current            1 Term 22 41w1d  3.629 kg (8 lb) M CS-LTranv Gen N LATISHA      Name: YANIV GONZALES      Apgar1: 10  Apgar5: 10   .  Medical History:   Past Medical History:   Diagnosis Date    Motion sickness     PONV (postoperative nausea and vomiting)     Seizures (H)     Epilepsy  Last    .  Gestational age 36w5d. Vital signs per doc flowsheet. Fetal movement present. Patient reports Scheduled  Section   as reason for admission. Support persons Max present.  Action: Verbal consent for EFM, external fetal monitors applied. Admission assessment completed. Patient and support persons educated on admission process. Patient instructed to report change in fetal movement, contractions, vaginal leaking of fluid or bleeding, abdominal pain, or any concerns related to the pregnancy to her nurse/physician. Patient oriented to room, call light in reach.   Response: Dr. Nickerson informed of patient arrival and MD was bedside to consent patient for surgery and blood transfusion. Consents signed at the bedside. 18 g IV to left wrist, labs drawn. LR infusing. Pre Op prep completed. Bedside SBAR to MYRA Llanes RN.

## 2025-04-30 NOTE — H&P
[facility]  Obstetrics History and Physical    Lauren Montague  : 1990  MRN: [id number]      HPI:  Ms. Lauren Montague is a 34 year old  at 36w4d by LMP, who presents for a scheduled repeat .  She denies contractions, vaginal bleeding, and loss of fluid.   + normal fetal movement.    Pregnancy Complications:  - History of myomectomy  - History of  x1  - Chronic Thrombocytopenia  - Epilepsy, Keppra 1000 BID    Prenatal Labs:   Lab Results   Component Value Date    AS Negative 2025    HEPBANG Nonreactive 10/10/2024    HGB 13.2 2025       GBS Status:   Lab Results   Component Value Date    GBS Positive (A) 2022       Ultrasounds:  [facility] US Comprehensive Single F/U  Narrative:         Comp Follow Up  ---------------------------------------------------------------------------------------------------------  Pat. Name: LAUREN MONTAGUE       Study Date:  2025 1:21pm  Pat. NO:  [id number]        Referring  MD: RENATO SAVAGE  Site:         Sonographer: Erin Aguila RDMS  :  1990        Age:   34  ---------------------------------------------------------------------------------------------------------    INDICATION  ---------------------------------------------------------------------------------------------------------  Seizure disorder on Keppra.  Advanced maternal age.  BMI 38.  History of .  History of myomectomy.  Maternal fibroid.    METHOD  ---------------------------------------------------------------------------------------------------------  Transabdominal ultrasound examination. View: Sufficient    PREGNANCY  ---------------------------------------------------------------------------------------------------------  [place] pregnancy. Number of fetuses: 1    DATING  ---------------------------------------------------------------------------------------------------------                                           Date                                 Details                                                                                      Gest. age                      EYAD  LMP                                  8/16/2024                                                                                                                         34 w + 0 d                     5/23/2025  Previous U/S                      11/5/2024                         GA, GA 12 w + 3 d                                                                     34 w + 6 d                     5/17/2025  U/S                                   4/11/2025                         based upon AC, BPD, Femur, HC                                                33 w + 5 d                     5/25/2025  Assigned dating                  based on the LMP, selected on 12/30/2024                                                                         34 w + 0 d                     5/23/2025    GENERAL EVALUATION  ---------------------------------------------------------------------------------------------------------  Cardiac activity present.  bpm. Fetal movements: present. Presentation: cephalic  Placenta: Anterior, No Previa, > 2 cm from internal os  Umbilical cord: 3 vessel cord  Amniotic fluid: Amount of AF: normal. MVP 5.0 cm    FETAL BIOMETRY  ---------------------------------------------------------------------------------------------------------  BPD                                                         83.1                    mm                         33w 3d                                                Hadlock  OFD                                                         110.6                  mm                         33w 2d                                                 Nicolaides  HC                                                           310.9                  mm                         34w 5d                                                  Hadlock  Cerebellum tr                                            42.9                    mm                         36w 6d                                                 Nicolaides  AC                                                           298.2                  mm                         33w 6d                      48%                    Hadlock  Femur                                                      63.1                    mm                         32w 4d                                                 Hadlock  Fetal Weight Calculation:  EFW                                                        2,217                  g                                                            31%                     Hadlock  EFW (lb,oz)                                              4 lb 14                oz  EFW by                                                     Hadlock (BPD---FL)  Head / Face / Neck Biometry:                                                              4.9                     mm  CM                                                           8.0                     mm    FETAL ANATOMY  ---------------------------------------------------------------------------------------------------------  The following structures appear normal:  Head / Neck                         Cranium. Head size. Head shape. Lateral ventricles. Midline falx. Cavum septi pellucidi. Cerebellum. Cisterna magna. Thalami.  Face                                   Lips. Profile.  Heart / Thorax                      4-chamber view. RVOT view. LVOT view. 3-vessel-trachea view.                                             Diaphragm.  Abdomen                             Stomach. Kidneys. Bladder.  Spine                                  Cervical spine. Thoracic spine. Lumbar spine. Sacral spine.    The following structures were documented previously:  Face                                   Nose.    Fetal sex: male.    MATERNAL  STRUCTURES  ---------------------------------------------------------------------------------------------------------  Uterus                                 Fibroid(s) Size 35 mm x 39 mm x 33 mm. Mean 35.7 mm. Vol 23.586 cmÂ . Left lateral wall  Cervix                                  Not visualized  Right Ovary                          Not examined  Left Ovary                            Not examined    RECOMMENDATION  ---------------------------------------------------------------------------------------------------------  Thank-you for referring your patient for ultrasound assessment.    I discussed the findings on today's ultrasound with the patient.    She is scheduled for a repeat  delivery between [id number] given history of myomectomy. Consideration for prenatal betamethasone given  delivery. She  will further discuss this with her primary provider.    Return to primary provider for continued prenatal care.    If you have questions regarding today's evaluation or if we can be of further service, please contact the [facility].    **Fetal anomalies may be present but not detected**    I spent a total of 10 minutes (excluding the ultrasound interpretation) on the date of this encounter including preparing to see the patient (reviewing medical records/tests),  in direct face-to-face contact with the patient during the visit counseling and discussing the plan of care and documenting the visit in the electronic medical record.  Impression: IMPRESSION  ---------------------------------------------------------------------------------------------------------  1. Armenta pregnancy at 34w 0d gestational age.  2. None of the anomalies commonly detected by ultrasound were evident in the limited fetal anatomic survey as described above.  3. Growth parameters and estimated fetal weight were appropriate for gestational age.  4. The amniotic fluid volume appeared normal.       OB  History  OB History    Para Term  AB Living   2 1 1 0 0 1   SAB IAB Ectopic Multiple Live Births   0 0 0 0 1      # Outcome Date GA Lbr [place]/2nd Weight Sex Type Anes PTL Lv   2 Current            1 Term 22 41w1d  3.629 kg (8 lb) M CS-LTranv Gen N LATISHA      Name: CHAD GONZALES-GAEL      Apgar1: 10  Apgar5: 10       PMHx:   Past Medical History:   Diagnosis Date    Motion sickness     PONV (postoperative nausea and vomiting)     Seizures (H)     Epilepsy  Last      PSHx:   Past Surgical History:   Procedure Laterality Date     SECTION N/A 2022    Procedure:  SECTION;  Surgeon: Yo Lindquist MD;  Location: [facility]    DILATION AND CURETTAGE, OPERATIVE HYSTEROSCOPY, COMBINED N/A 2021    Procedure: HYSTEROSCOPY, REMOVAL OF INTRAUTERINE DEVICE;  Surgeon: Yo Lindquist MD;  Location: [facility] OR    MYOMECTOMY ABDOMINAL APPROACH      Large fundal fibroid.  Labor contraindicated     Meds:   Medications Prior to Admission   Medication Sig Dispense Refill Last Dose/Taking    folic acid (FOLVITE) 1 MG tablet Take 1 mg by mouth 2 times daily.       levETIRAcetam (KEPPRA) 500 MG tablet Take 1,500 mg by mouth 2 times daily.       loratadine (CLARITIN) 10 MG tablet Take 10 mg by mouth every morning.       Prenatal Vit-Fe Fumarate-FA (PRENATAL MULTIVITAMIN W/IRON) 27-0.8 MG tablet Take 1 tablet by mouth every morning.       Vitamin D, Cholecalciferol, 25 MCG (1000 UT) CAPS Take 2,000 Units by mouth every morning.        Allergies:     Allergies   Allergen Reactions    Morphine Nausea and Vomiting    Penicillins Swelling and Other (See Comments)     PN: swelled up    Dust Mites Other (See Comments)    Cat Dander Unknown, Itching and Rash    Pollen Extract Itching and Rash     FmHx:   Family History   Problem Relation Age of Onset    No Known Problems Mother     No Known Problems Father     No Known Problems Maternal Grandmother     No Known  Problems Maternal Grandfather     No Known Problems Paternal Grandmother     No Known Problems Paternal Grandfather     No Known Problems Brother     No Known Problems Sister     No Known Problems Son      SocHx: She denies any tobacco, alcohol, or other drug use during this pregnancy.    ROS:   Complete ROS negative except as noted in HPI.She  headache, blurry vision, chest pain, shortness of breath, RUQ pain, nausea, vomiting, dysuria, hematuria or extremity edema.    PE:  Vit: Patient Vitals for the past 4 hrs:   BP   25 0651 130/66      Gen: Well-appearing, NAD, comfortable   CV: Regular rate  Pulm: NWOB  Abd: Soft, gravid, non-tender  Cx: Deferred    Memb: intact    FHT: Baseline 135, moderate variability, + accelerations, no decelerations   Fitzpatrick: 1 contraction in 10 minutes      Assessment  Ms. Lauren Montague is a 34 year old , at 36w4d by LMP, who presents for scheduled repeat  section.    Plan  Admit to L&D for scheduled repeat  section in the setting of a history of abdominal myomectomy and 1 prior . No significant adhesions were noted at time of previous . QBL of 1008 at previous delivery. The risks, benefits, and alternatives of  section were discussed, including the risks of bleeding, infection, injury to surrounding organs, fetal injury, and remote risks of hysterectomy. She consented to a blood transfusion in the event of a life threatening amount of bleeding. She had time to ask questions and agreed to proceed. Surgical consent was signed.    Pain:  per anesthesia.   ID:  angie-op antibiotics.  FWB: Category 1 FHT.  Continue EFM.   PNC: Rh pos, Rubella immune, GBS neg, GCT failed, passed GTT, Placenta anterior  Fen/GI: NPO, IVFs.  PPH ppx: CBC, T&S.     Chronic TCP  - Followed by hematology, has received a full workup which has been negative. Most recent note on . Telephone Encounter by Rinku Melton PA-C (2025 9:02 AM)   - Patient has  historically been asymptomatic.   - Platelets have down-trended in this pregnancy in a similar manner to previous pregnancy with most recent platelets in the 70s. Today plts are 69. Anesthesia to assess for possibility of neuraxial anesthesia for surgery. Might require general anesthesia. This has been discussed with patient.    Epilepsy  - PTA Keppra 1500 BID  - Followed by neurology. Recommended keppra level be drawn prior to discharge and plan for patient to down-titrate to 1000 BID 1 week after delivery in the outpatient setting.    The patient was discussed with Dr. Crawford who is in agreement with the treatment plan.    George Nickerson MD  Obstetrics & Gynecology, PGY-2  04/30/2025 8:32 AM

## 2025-04-30 NOTE — OP NOTE
[facility]  Full Operative Progress Note     Surgery Date:  2025    Surgeon:  Natali Crawford MD    Assistants:  George Nickerson, PGY-2    Savana Murry, MS3    Pre-op Diagnosis:    - Intrauterine pregnancy at 36w5d  - History of myomectomy  - History of  x1  - Chronic Thrombocytopenia  - Epilepsy    Post-op Diagnosis:    - Same   - Vigorous infant     Procedure: Primary low-transverse  section with single layer uterine closure via pfannenstiel incision    Anesthesia: Spinal  EBL: 394 ml   IVF: 1400 mL crystalloid  UOP: 125 mL clear urine at the end of the case  Drains: Martinez Catheter   Specimens: Routine cord blood, cord segment  Additional Medications: Gentamicin and Clindamycin  Complications: None apparent    Indications:   Lauren Montague is a 34 year old  at 36w5d admitted for a scheduled repeat  in the setting of a history of an abdominal myomectomy and one  section. The risks, benefits, and alternatives of  section were discussed with the patient including bleeding, infection, injury to surrounding structures (nerves, blood vessels, uterus, cervix, tubes, ovaries, bladder, bowel, rarely baby), and she agreed to proceed. Written consent obtained for proceeding with  section and for blood transfusion in the setting of hemorrhage or acute blood loss anemia.    Findings:   Moderate subcutaneous adhesions. Mild rectofascial adhesions. No intraabdominal adhesions.  Clear amniotic fluid  Vigorous male infant in cephalic presentation. Apgars 9 at 1 minute & 9 at 5 minutes. Weight 3010g.  Uterus notable for 1 cm subserosal fibroid near left cornua and a 5cm posterior fibroid. Uterus was otherwise normal. Fallopian tubes and ovaries normal.     Procedure Details:   The patient was brought to the OR, where adequate spinal anesthesia was administered.  She was placed in the dorsal supine position with a slight leftward tilt. She was  prepped and draped in the usual sterile fashion. A surgical time out was performed. A pfannenstiel skin incision was made with the scalpel, and carried down to the underlying fascia with sharp and blunt dissection. The fascia was incised in the midline, and the incision was extended laterally with the Traylor scissors. The superior aspect of the fascia was grasped with the Kocher clamps and dissected off of the underlying rectus muscles with blunt and sharp dissection. The rectus muscles were  in the midline, and the peritoneum was entered bluntly, and the opening was extended with digital pressure and electrosurgery. The bladder blade was placed. A transverse hysterotomy was made with the scalpel in the lower uterine segment, and the incision was extended with digital pressure. The infant was noted to be in the ROT position, and was delivered atraumatically. The shoulders delivered easily.  No nuchal cord was noted. The cord was doubly clamped and cut after 60 seconds, and the infant was handed off to the awaiting NICU staff. A segment of cord was cut and collected. The placenta was extracted manually. Uterine tone was noted to be adequate with pitocin given through the running IV and uterine massage.  The hysterotomy was closed with a running locked suture of 0 Vicryl.  TXA was administered largely prophylactically in the setting of her risk factors. The hysterotomy was noted to be hemostatic.   The pericolic gutters were cleared. The hysterotomy was reexamined and noted to be hemostatic. The fascia and rectus muscles were examined and areas of oozing were controlled with electrocautery. The fascia was closed with a running 0 Looped PDS suture. The subcutaneous tissue was irrigated and areas of oozing were controlled with electrocautery. The subcutaneous tissue was closed with 2-0 plain gut. In order to alleviate tension and approximate skin edges a subdermal suture of 3-0 Vicryl was placed. The skin was  closed with 4-0 Monocryl and covered with steri strips and overlying sterile dressing.    All sponge, needle, and instrument counts were correct. The patient tolerated the procedure well, and was transferred to recovery in stable condition.     Dr. Crawford was present and scrubbed for the procedure.     George Nickerson MD  Obstetrics & Gynecology, PGY-2  04/30/2025 10:40 AM

## 2025-04-30 NOTE — PROGRESS NOTES
Patient arrived to [facility] unit via zoom cart at 12:30 ,with belongings, accompanied by spouse/ significant other, with infant in arms. Received report from  Natalie Llanes RN  and checked bands. Unit and room orientation completd. Call light given; no concerns present at this time. Continue with plan of care.

## 2025-04-30 NOTE — DISCHARGE SUMMARY
[facility] Discharge Summary    Lauren Montague MRN# [id number]   Age: 34 year old YOB: 1990     Date of Admission:  2025  Date of Discharge:  ***  Admitting Physician:  Annmarie Campa MD  Discharge Physician:  ***    Admit Dx:   - Intrauterine pregnancy at 36w5d  - History of myomectomy  - History of  x1  - Chronic Thrombocytopenia  - Epilepsy    Discharge Dx:  - Same as above, now  s/p repeat low transverse  section  - ***    Procedures:  - Repeat low transverse  section with single layer uterine closure via Pfannenstiel incision  - Spinal analgesia  - ***    Consults:  - Anesthesia  - ***    Admit HPI:  Ms. Lauren Montague is a 34 year old  at 36w4d by LMP, who presents for a scheduled repeat .  She denies contractions, vaginal bleeding, and loss of fluid.   + normal fetal movement   Please see her admit H&P for full details of her PMH, PSH, Meds, Allergies and exam on admit.    Operative Course:  Surgery was uncomplicated. QBL from the delivery was 394. Please see her  Section Operative Note for full details regarding her delivery.    Operative Findings:   Moderate subcutaneous adhesions. Mild rectofascial adhesions. No intraabdominal adhesions.  Clear amniotic fluid  Vigorous male infant in cephalic presentation. Apgars 9 at 1 minute & 9 at 5 minutes. Weight 3010g.  Uterus notable for 1 cm subserosal fibroid near left cornua and a 5cm posterior fibroid. Uterus was otherwise normal. Fallopian tubes and ovaries normal.     Postoperative Course:  Her postoperative course was uncomplicated ***. On POD#***, she was meeting all of her postpartum goals and deemed stable for discharge. She was voiding without difficulty, tolerating a regular diet without nausea and vomiting, her pain was well controlled on oral pain medicines, and her lochia was appropriate. Her hemoglobin prior to delivery was *** and after delivery  was ***. Her Rh status was *** and Rhogam was not*** indicated.    Discharge Medications:  ***     Review of your medicines        UNREVIEWED medicines. Ask your doctor about these medicines        Dose / Directions   folic acid 1 MG tablet  Commonly known as: FOLVITE      Dose: 1 mg  Take 1 mg by mouth 2 times daily.  Refills: 0     levETIRAcetam 500 MG tablet  Commonly known as: KEPPRA      Dose: 1,500 mg  Take 1,500 mg by mouth 2 times daily.  Refills: 0     loratadine 10 MG tablet  Commonly known as: CLARITIN      Dose: 10 mg  Take 10 mg by mouth every morning.  Refills: 0     prenatal multivitamin w/iron 27-0.8 MG tablet      Dose: 1 tablet  Take 1 tablet by mouth every morning.  Refills: 0     Vitamin D (Cholecalciferol) 25 MCG (1000 UT) Caps      Dose: 2,000 Units  Take 2,000 Units by mouth every morning.  Refills: 0              Discharge/Disposition:  Lauren Montague was discharged to home in stable condition with the following instructions/medications:  1) Call for temperature > 100.4, bright red vaginal bleeding >1 pad an hour x 2 hours, foul smelling vaginal discharge, pain not controlled by usual oral pain meds, persistent nausea and vomiting not controlled on medications, drainage or redness from incision site  2) She desired *** for contraception.  3) For feeding she decided to ***.  4) She was instructed to follow-up with her primary OB in 6 weeks for a routine postpartum visit and ***.  5) Discharge activity:  No heavy lifting >15 lbs or strenuous activity for 6 weeks, pelvic rest for 6 weeks, no driving or operating machinery while on narcotics.    *** sign, update date/time, refresh     medicines which have NOT CHANGED        Dose / Directions   folic acid 1 MG tablet  Commonly known as: FOLVITE      Dose: 1 mg  Take 1 mg by mouth 2 times daily.  Refills: 0     loratadine 10 MG tablet  Commonly known as: CLARITIN      Dose: 10 mg  Take 10 mg by mouth every morning.  Refills: 0     prenatal multivitamin w/iron 27-0.8 MG tablet      Dose: 1 tablet  Take 1 tablet by mouth every morning.  Refills: 0     Vitamin D (Cholecalciferol) 25 MCG (1000 UT) Caps      Dose: 2,000 Units  Take 2,000 Units by mouth every morning.  Refills: 0               Where to get your medicines        These medications were sent to [facility] [place], [place] - [address]  [address] [address]      Phone: [phone]   acetaminophen 325 MG tablet  ibuprofen 600 MG tablet  senna-docusate 8.6-50 MG tablet         Discharge/Disposition:  Lauren Montague was discharged to home in stable condition with the following instructions/medications:  1) Call for temperature > 100.4, bright red vaginal bleeding >1 pad an hour x 2 hours, foul smelling vaginal discharge, pain not controlled by usual oral pain meds, persistent nausea and vomiting not controlled on medications, drainage or redness from incision site.  2) She opts to discuss birth control at her postpartum visit.  3) For feeding she decided to breastfeed..  4) She was instructed to follow-up with her primary OB in 6 weeks for a routine postpartum visit and to follow up with her neurology team postpartum.  5) Discharge activity:  No heavy lifting >15 lbs or strenuous activity for 6 weeks, pelvic rest for 6 weeks, no driving or operating machinery while on narcotics.    Lenard Pierre MD  OB/GYN PGY-3  05/02/2025 7:07 AM

## 2025-04-30 NOTE — PLAN OF CARE
Goal Outcome Evaluation:      Plan of Care Reviewed With: patient      Pt VSS, postpartum assessment WDL.  Pain managed with tylenol and toradol.  C/s incision covered with guaze.  Pt is bonding well with baby and breastfeeding on cue.  Pt is not OOB yet, tolerating regular diet.  Martinez in place with adequate output.        Problem: Adult Inpatient Plan of Care  Goal: Optimal Comfort and Wellbeing  Outcome: Progressing  Intervention: Monitor Pain and Promote Comfort  Recent Flowsheet Documentation  Taken 4/30/2025 1302 by Skye Marie RN  Pain Management Interventions: medication (see MAR)  Intervention: Provide Person-Centered Care  Recent Flowsheet Documentation  Taken 4/30/2025 1400 by Skye Marie, RN  Trust Relationship/Rapport:   care explained   choices provided   questions answered   questions encouraged   thoughts/feelings acknowledged

## 2025-04-30 NOTE — PLAN OF CARE
[id number]  Vital signs within normal limits. Postpartum checks within normal limits. Adequate output. Denies pain. Breastfeeding on cue and supplementing donor milk. Positive attachment behaviors observed with infant. Support person present and very supportive. Will continue to assess/assist as needed.

## 2025-04-30 NOTE — PLAN OF CARE
Pt stable throughout PACU stay. Pain well controlled with spinal and tap block. Veronique ice chips and water. Scant lochia. Bands checked and report given to Skye RN who assumed care.

## 2025-04-30 NOTE — ANESTHESIA PROCEDURE NOTES
"Intrathecal injection Procedure Note    Pre-Procedure   Staff -        Anesthesiologist:  Raven Urban MD       Resident/Fellow: Paxton Flynn MD       Other Anesthesia Staff: Oscar Palacios MD       Performed By: resident       Location: OB       Procedure Start/Stop Times: 4/30/2025 8:55 AM and 4/30/2025 8:59 AM       Pre-Anesthestic Checklist: patient identified, IV checked, risks and benefits discussed, informed consent, monitors and equipment checked, pre-op evaluation, at physician/surgeon's request and post-op pain management  Timeout:       Correct Patient: Yes        Correct Procedure: Yes        Correct Site: Yes        Correct Position: Yes   Procedure Documentation  Procedure: intrathecal injection         Patient Position: sitting       Skin prep: Chloraprep       Insertion Site: L3-4. (midline approach).       Needle Gauge: 25.        Needle Length (Inches): 3.5        Spinal Needle Type: Pencan       Introducer used       Introducer: 20 G       # of attempts: 1 and  # of redirects:  0    Assessment/Narrative         Paresthesias: No.       CSF fluid: clear.       Opening pressure was cmH2O while  Sitting.      Medication(s) Administered   0.75% Hyperbaric Bupivacaine (Intrathecal) - Intrathecal   1.6 mL - 4/30/2025 9:13:00 AM  Fentanyl PF (Intrathecal) - Intrathecal   15 mcg - 4/30/2025 9:13:00 AM  Morphine PF 1 mg/mL (Intrathecal) - Intrathecal   0.15 mg - 4/30/2025 9:13:00 AM  Medication Administration Time: 4/30/2025 8:55 AM      FOR [facility] ([facility]) ONLY:   Pain Team Contact information: please page the Pain Team Via Sway. Search \"Pain\". During daytime hours, please page the attending first. At night please page the resident first.      "

## 2025-04-30 NOTE — ANESTHESIA CARE TRANSFER NOTE
Patient: Lauren Montague    Procedure: Procedure(s):  REPEAT  SECTION       Diagnosis: Previous  delivery, antepartum condition or complication [O34.219]  History of myomectomy [Z98.890]  Diagnosis Additional Information: No value filed.    Anesthesia Type:   Spinal, General     Note:    Oropharynx: spontaneously breathing  Level of Consciousness: awake  Oxygen Supplementation: room air    Independent Airway: airway patency satisfactory and stable  Dentition: dentition unchanged  Vital Signs Stable: post-procedure vital signs reviewed and stable  Report to RN Given: handoff report given  Patient transferred to: Labor and Delivery    Handoff Report: Identifed the Patient, Identified the Reponsible Provider, Reviewed the pertinent medical history, Discussed the surgical course, Reviewed Intra-OP anesthesia mangement and issues during anesthesia, Set expectations for post-procedure period and Allowed opportunity for questions and acknowledgement of understanding      Vitals:  Vitals Value Taken Time   /55 25 1013   Temp     Pulse 79 25 1013   Resp     SpO2 97 % 25 1013       Electronically Signed By: Oscar CASSIDY Ma, MD  2025  10:27 AM

## 2025-05-01 VITALS
HEART RATE: 75 BPM | OXYGEN SATURATION: 96 % | SYSTOLIC BLOOD PRESSURE: 125 MMHG | TEMPERATURE: 98 F | RESPIRATION RATE: 14 BRPM | DIASTOLIC BLOOD PRESSURE: 75 MMHG

## 2025-05-01 LAB
ERYTHROCYTE [DISTWIDTH] IN BLOOD BY AUTOMATED COUNT: 13.6 % (ref 10–15)
HCT VFR BLD AUTO: 34.5 % (ref 35–47)
HGB BLD-MCNC: 11.5 G/DL (ref 11.7–15.7)
LEVETIRACETAM SERPL-MCNC: 31 ÂΜG/ML (ref 10–40)
MCH RBC QN AUTO: 27.8 PG (ref 26.5–33)
MCHC RBC AUTO-ENTMCNC: 33.3 G/DL (ref 31.5–36.5)
MCV RBC AUTO: 84 FL (ref 78–100)
PLATELET # BLD AUTO: 69 10E3/UL (ref 150–450)
RBC # BLD AUTO: 4.13 10E6/UL (ref 3.8–5.2)
WBC # BLD AUTO: 13.6 10E3/UL (ref 4–11)

## 2025-05-01 PROCEDURE — 250N000013 HC RX MED GY IP 250 OP 250 PS 637

## 2025-05-01 PROCEDURE — 85014 HEMATOCRIT: CPT

## 2025-05-01 PROCEDURE — 36415 COLL VENOUS BLD VENIPUNCTURE: CPT

## 2025-05-01 PROCEDURE — 80177 DRUG SCRN QUAN LEVETIRACETAM: CPT

## 2025-05-01 PROCEDURE — 120N000002 HC R&B MED SURG/OB UMMC

## 2025-05-01 RX ORDER — IBUPROFEN 600 MG/1
600 TABLET, FILM COATED ORAL EVERY 6 HOURS PRN
Qty: 60 TABLET | Refills: 0 | Status: SHIPPED | OUTPATIENT
Start: 2025-05-01 | End: 2025-05-31

## 2025-05-01 RX ORDER — LEVETIRACETAM 500 MG/1
1000 TABLET ORAL 2 TIMES DAILY
DISCHARGE
Start: 2025-05-01

## 2025-05-01 RX ORDER — AMOXICILLIN 250 MG
1 CAPSULE ORAL DAILY
Qty: 30 TABLET | Refills: 0 | Status: SHIPPED | OUTPATIENT
Start: 2025-05-01 | End: 2025-05-31

## 2025-05-01 RX ORDER — LEVETIRACETAM 500 MG/1
1500 TABLET ORAL 2 TIMES DAILY
DISCHARGE
Start: 2025-05-01 | End: 2025-05-08

## 2025-05-01 RX ORDER — ACETAMINOPHEN 325 MG/1
650 TABLET ORAL EVERY 6 HOURS PRN
Qty: 100 TABLET | Refills: 0 | Status: SHIPPED | OUTPATIENT
Start: 2025-05-01 | End: 2025-05-31

## 2025-05-01 RX ADMIN — ACETAMINOPHEN 975 MG: 325 TABLET ORAL at 15:38

## 2025-05-01 RX ADMIN — IBUPROFEN 800 MG: 800 TABLET, FILM COATED ORAL at 05:08

## 2025-05-01 RX ADMIN — LEVETIRACETAM 1500 MG: 750 TABLET, FILM COATED ORAL at 08:53

## 2025-05-01 RX ADMIN — LEVETIRACETAM 1500 MG: 750 TABLET, FILM COATED ORAL at 20:16

## 2025-05-01 RX ADMIN — ACETAMINOPHEN 975 MG: 325 TABLET ORAL at 08:54

## 2025-05-01 RX ADMIN — IBUPROFEN 800 MG: 800 TABLET, FILM COATED ORAL at 20:16

## 2025-05-01 RX ADMIN — SENNOSIDES AND DOCUSATE SODIUM 2 TABLET: 50; 8.6 TABLET ORAL at 08:53

## 2025-05-01 RX ADMIN — IBUPROFEN 800 MG: 800 TABLET, FILM COATED ORAL at 13:45

## 2025-05-01 RX ADMIN — ACETAMINOPHEN 975 MG: 325 TABLET ORAL at 21:47

## 2025-05-01 RX ADMIN — SIMETHICONE 80 MG: 80 TABLET, CHEWABLE ORAL at 14:26

## 2025-05-01 RX ADMIN — ACETAMINOPHEN 975 MG: 325 TABLET ORAL at 01:40

## 2025-05-01 RX ADMIN — DIPHENHYDRAMINE HYDROCHLORIDE 25 MG: 25 CAPSULE ORAL at 09:44

## 2025-05-01 RX ADMIN — SIMETHICONE 80 MG: 80 TABLET, CHEWABLE ORAL at 01:40

## 2025-05-01 ASSESSMENT — ACTIVITIES OF DAILY LIVING (ADL)
ADLS_ACUITY_SCORE: 24
ADLS_ACUITY_SCORE: 25
ADLS_ACUITY_SCORE: 25
ADLS_ACUITY_SCORE: 24
ADLS_ACUITY_SCORE: 25
ADLS_ACUITY_SCORE: 25
ADLS_ACUITY_SCORE: 24
ADLS_ACUITY_SCORE: 25
ADLS_ACUITY_SCORE: 24
ADLS_ACUITY_SCORE: 25
ADLS_ACUITY_SCORE: 24
ADLS_ACUITY_SCORE: 24
ADLS_ACUITY_SCORE: 25
ADLS_ACUITY_SCORE: 24
ADLS_ACUITY_SCORE: 25

## 2025-05-01 NOTE — LACTATION NOTE
This note was copied from a baby's chart.  Consult For: Late  Infant    Infant Name: Kendrick    Infant's Primary Care Clinic: [facility]    Delivery Information: Baby girl was born at Gestational Age: 36w5d via   delivery on 2025 9:23 AM     Breastfeeding goal (if known): direct breastfeeding    Maternal Health History:    Information for the patient's mother:  Lauren Montague [[id number]]     Past Medical History:   Diagnosis Date    Motion sickness     PONV (postoperative nausea and vomiting)     Seizures (H)     Epilepsy  Last    ,   Information for the patient's mother:  Lauren Montague [[id number]]     Patient Active Problem List   Diagnosis    Thrombocytopenia    Pelvis Mass    Epilepsy (H)    Sciatica    Retroversion Of Uterus    MARY (generalized anxiety disorder)    Obesity    Vitamin D deficiency    Major depressive disorder    Need for diphtheria-tetanus-pertussis (Tdap) vaccine    S/P  section   , and   Information for the patient's mother:  Lauren Montague [[id number]]     Medications Prior to Admission   Medication Sig Dispense Refill Last Dose/Taking    folic acid (FOLVITE) 1 MG tablet Take 1 mg by mouth 2 times daily.       levETIRAcetam (KEPPRA) 500 MG tablet Take 1,500 mg by mouth 2 times daily.       loratadine (CLARITIN) 10 MG tablet Take 10 mg by mouth every morning.       Prenatal Vit-Fe Fumarate-FA (PRENATAL MULTIVITAMIN W/IRON) 27-0.8 MG tablet Take 1 tablet by mouth every morning.       Vitamin D, Cholecalciferol, 25 MCG (1000 UT) CAPS Take 2,000 Units by mouth every morning.        Keppra-L2: Limited Data-Probably Compatible; infant monitoring: sedation or irritability, not waking to feed/poor feeding, and weight gain.     Maternal Breast Exam:  Lauren noted breast growth and sensitivity in early pregnancy. Her breasts are soft and symmetrical with bilateral intact, everted  nipples. She has been able to hand express colostrum.     Breastfeeding/  Lactation History: Difficulty breastfeeding first child (now 2.5 years old) used NS and worked with outpatient lactation.     Oral exam of baby: did not assess during this consult-baby at breast     Infant information: Kendrick is LPT at 36 + 5 GA,  AGA at birth and has age appropriate output and weight loss. BGs WDL after initial hypoglycemia requiring glucose gel x 1 at 1 hour of age.     Weight Change Since Birth: -6% at 1 day old     Feeding History:  breastfeeding + supplementation per LPT orders     Feeding Assessment:  Mother brings baby to breast in football hold, he is eager at the breast and latches quickly. Initial latch is shallow, LC reviews how to deepen latch by gently pushing from top of areola while baby suckles. After a few sucks latch appears deeper and Lauren reports it is comfortable.   Baby sustains latch actively for about 10 minutes and then becomes sleepy.     Education:   [x] Potential feeding challenges of late  infants  [x] Potential benefits of using a nipple shield  [x] Expected  feeding patterns in the first few days (pg. 38 of Your Guide to To Postpartum and  Care)/ the Second Night  [x] Stages of milk production  [x] Benefits of hand expression of colostrum  [x] Feeding frequency- encourage at least every 3 hours.    [x] Benefits of skin to skin  [x] Breastfeeding positions  [x] Tips to get and maintain a deep latch  [x] Nutritive vs.non-nutritive sucking  [x] How to tell when baby is finished  [x] How to tell if baby is getting enough  [x] Expected  output  [x]  weight loss  [x] Infant Feeding Log  [x] Signs breastfeeding is going well (comfortable latch, audible swallows, age appropriate output and weight loss)    [x] Tips to prevent engorgement  [x] Signs of engorgement  [x] Tips to manage engorgement  [x] Hand expression and pumping recommendations  [x] Supplement recommendations   [x] Inpatient breastfeeding support  [x] Outpatient lactation  resources and follow up recommendations    Handouts: Infant Feeding Log (Week 1, Your Guide to Postpartum &  Care Book) and [facility] Lactation Resources  *Reviewed tracking feedings, Lauren shared that tracking I/O with her first child caused lots of anxiety. She is exploring different techniques for tracking without too much focus (I.e. diaper counts vs logging)     Home Breast Pump: Has pump at home     Plan: Continue breastfeeding every 2-3 hours with RN support as needed with a goal of 8-12 feedings per day. Watch for early feeding cues, but if too sleepy and no cues after 3 hours offer breast and go directly to supplementation if no interest.     Encourage frequent skin to skin. Due to infant prematurity, encourage hand expression after each feeding until milk is in and hands on pumping at least 6-8 times in 24 hours to help bring in full milk supply. Feed back any expressed milk and review order set for supplement recommendations. Continue giving expressed milk supplement until closer to expected due date, or as indicated with follow up lactation visits.      Family encouraged to follow up with outpatient lactation consultant at clinic within a week of discharge for support with LPT infant, help to monitor infant weight and milk transfer, establish supply & eventually wean from pumping and nipple shield if used.           Carolyn Regalado, RN, IBCLC   Lactation Consultant  Karthik: [facility] [phone]  Office: [phone]

## 2025-05-01 NOTE — PLAN OF CARE
Goal Outcome Evaluation: Improving       Plan of Care Reviewed With: patient, spouse    Overall Patient Progress: improvingOverall Patient Progress: improving     Vitals & PP assessments within normal range. Lungs clear. Pain well controled with PO pain meds.   Walking to Bathroom & voided 500 & 250. Breast feeding well & supplementing with Donor Breast milk.   Doing skin to skin.   Positive bonding with baby observed.   Continue on supportive cares.     Problem: Adult Inpatient Plan of Care  Goal: Optimal Comfort and Wellbeing  Intervention: Provide Person-Centered Care  Recent Flowsheet Documentation  Taken 4/30/2025 2120 by Mauricio Cooney, RN  Trust Relationship/Rapport:   care explained   choices provided   emotional support provided   thoughts/feelings acknowledged   reassurance provided   questions encouraged

## 2025-05-01 NOTE — PROVIDER NOTIFICATION
04/30/25 2052   Provider Notification   Provider Name/Title Dr Spears G2   Method of Notification Electronic Page   Request Evaluate-Remote   Notification Reason Other     Do you still want this Pt get Toradol with Platelets 69?    Per Dr Spears, Please hold Toradol.

## 2025-05-01 NOTE — PROGRESS NOTES
Post  Anesthesia Follow Up Note    Patient: Lauren Montague    Patient location: Postpartum floor.    Chief complaint: Acute postoperative pain management s/p intrathecal analgesic administration     Procedure(s) Performed:  Procedure(s):  REPEAT  SECTION    Anesthesia type: Spinal Block    Subjective:     Pain Control: Adequate    Additional ROS:  She denies weakness, denies paresthesia, denies difficulties breathing or voiding, denies nausea or vomiting, and denies headache. She  does not complain of pruritis at this time. She is able to ambulate. She does tolerate regular diet. Overall she is very happy she got to do the spinal and be awake for her .    Objective:    Respiratory Function (RR / SpO2 / Airway Patency): Satisfactory    Cardiac Function (HR / Rhythm / BP): Satisfactory    Strength and sensation lower extremities: Normal    Site of spinal/epidural insertion: No signs of infection or inflammation.     Last Vitals: /62 (Patient Position: Semi-Young's, Cuff Size: Adult Regular)   Pulse 77   Temp 37  C (98.6  F) (Oral)   Resp 16   LMP 2024   SpO2 96%   Breastfeeding Unknown     Assessment and plan:   Lauren Montague is a 34 year old female  POD #1 s/p   with IT bupivacaine, fentanyl and morphine; and single shot TAP nerve block injections.  Pt is ambulating without difficulty, no weakness or paresthesias.  There is no evidence of adverse side effects associated with spinal and nerve block injections.  The patient is receiving adequate incisional pain control at this time and anticipate up to 72 hours of incisional pain control.  However, we further anticipate that the patient will require opioid/nonopioid analgesics for visceral and muscle pain that is not controlled with local anesthetic.      In brief summary, her post-operative analgesia is adequate today. Further interventional analgesic strategies would be of little utility at this time.  Thus, we recommend proceeding with PO analgesics.    Thank you for including us in the care for this patient.    Paxton Flynn MD  Anesthesiology Resident, CA-3, PGY-4

## 2025-05-01 NOTE — PROGRESS NOTES
[facility]   Post-partum Note    Name:  Lauren Montague  MRN: [id number]    S: Patient is doing well overall.  Pain well controlled. Voiding spontaneously. Tolerating regular diet without nausea or vomiting.  Ambulating without dizziness.  Lochia appropriate.  Breastfeeding.  Plans to discuss BC at 6 week visit.    O:   Patient Vitals for the past 24 hrs:   BP Temp Temp src Pulse Resp SpO2   25 0500 108/66 98  F (36.7  C) Oral 63 16 --   25 0100 114/72 98.8  F (37.1  C) Oral 63 16 96 %   25 2100 115/61 98.8  F (37.1  C) Oral 69 16 96 %   25 1710 -- -- -- -- -- 96 %   25 1700 118/64 -- -- -- 18 94 %   25 1600 125/78 97.9  F (36.6  C) Oral -- 18 96 %   25 1500 120/73 -- -- -- -- 99 %   25 1400 127/77 98.3  F (36.8  C) Oral -- 16 99 %   25 1302 119/70 -- -- -- -- 98 %   25 1230 129/72 -- -- -- -- 100 %   25 1200 114/82 98.1  F (36.7  C) Oral 83 10 99 %   25 1145 (!) 122/98 -- -- 79 18 99 %   25 1130 137/75 -- -- 77 16 98 %   25 1115 127/86 -- -- 78 13 98 %   25 1100 120/72 -- -- 82 16 98 %   25 1030 125/88 96.9  F (36.1  C) Axillary 78 12 97 %   25 1013 127/55 -- -- 79 -- 97 %   25 0651 130/66 -- -- -- -- --     Gen:  Resting comfortably, NAD  CV:  RR, well perfused  Pulm:  Regular work of breathing  Abd:  Soft, appropriately ttp,  distended. Fundus at umbilicus, firm and non-tender. Incision covered with bandage, surrounding skin c/d/I    I/O last 3 completed shifts:  In:  [P.O.:550; I.V.:1547]  Out:  [Urine:1575; Blood:394]    Hgb:   Hemoglobin   Date Value Ref Range Status   2025 11.5 (L) 11.7 - 15.7 g/dL Final     Platelet Count   Date Value Ref Range Status   2025 69 (L) 150 - 450 10e3/uL Final       Assessment/Plan:  Lauren Montague 34 year old  on POD #1 s/p scheduled RLTCS. VSS. Early postoperative period, starting to meet goals for discharge.      #Postpartum management  Pain: Well-controlled with ibuprofen, tylenol, and oxycodone PRN  Hgb: Hgb 13.6>  (TXA) > 11.5  Rh: Positive   Rubella: Immune   GI: PRN bowel regimen and antiemetics, simethicone PRN.  : S/p guadarrama, voiding spontaneously  Feed: Breastfeeding is going well  BC: Declines for now. Will discuss at 6w visit.    # Chronic TCP  - plt 69. Repeat stable at 69. toradol held but still receiving ibuprofen  - lovenox indicated due to BMI + CS, but held given platelet count      #Epilepsy  - continue PTA Keppra  - level ordered today and normal at 31  - discussed plan -- Per neuro (scanned letter) Decrease Keppra to 1000mg PO BID 1w pp and Keppra level 1w after that.  Also, draw Keppra level while still in the hospital - done     Medically Ready for Discharge: Anticipated Tomorrow    George Nickerson MD  Obstetrics & Gynecology, PGY-2  05/01/2025 6:49 AM      Physician Attestation   I, Violeta Smith, personally saw and examined Lauren.    I appreciate the note by Dr. Nickerson.   I saw and evaluated the patient separately and agree with the findings and plan of care as documented in the note. I have reviewed her vitals, labs, and medications. I have discussed the plan of care with the resident.   She is doing well. Has no questions regarding delivery.  She is ambulating and feeling appropriate for POD # 1.  Labs reviewed and are stable.   Vitals:    05/01/25 0500 05/01/25 0851   BP: 108/66 118/62      Hemoglobin   Date Value Ref Range Status   05/01/2025 11.5 (L) 11.7 - 15.7 g/dL Final   04/30/2025 13.2 11.7 - 15.7 g/dL Final      We discussed routine post op/ pp cares and normal expectations for recovery.      Medically Ready for Discharge: Anticipated Tomorrow         Violeta Smith MD  Date of Service (when I saw the patient): May 1, 2025

## 2025-05-02 VITALS
HEART RATE: 65 BPM | DIASTOLIC BLOOD PRESSURE: 80 MMHG | TEMPERATURE: 98.7 F | RESPIRATION RATE: 16 BRPM | OXYGEN SATURATION: 96 % | BODY MASS INDEX: 40 KG/M2 | SYSTOLIC BLOOD PRESSURE: 122 MMHG | WEIGHT: 218.7 LBS

## 2025-05-02 PROCEDURE — 250N000013 HC RX MED GY IP 250 OP 250 PS 637

## 2025-05-02 RX ORDER — OXYCODONE HYDROCHLORIDE 5 MG/1
5 TABLET ORAL
Qty: 6 TABLET | Refills: 0 | Status: SHIPPED | OUTPATIENT
Start: 2025-05-02

## 2025-05-02 RX ADMIN — DIPHENHYDRAMINE HYDROCHLORIDE 25 MG: 25 CAPSULE ORAL at 12:20

## 2025-05-02 RX ADMIN — ACETAMINOPHEN 975 MG: 325 TABLET ORAL at 10:09

## 2025-05-02 RX ADMIN — IBUPROFEN 800 MG: 800 TABLET, FILM COATED ORAL at 08:58

## 2025-05-02 RX ADMIN — LEVETIRACETAM 1500 MG: 750 TABLET, FILM COATED ORAL at 08:57

## 2025-05-02 RX ADMIN — SENNOSIDES AND DOCUSATE SODIUM 1 TABLET: 50; 8.6 TABLET ORAL at 08:58

## 2025-05-02 RX ADMIN — DIPHENHYDRAMINE HYDROCHLORIDE 25 MG: 25 CAPSULE ORAL at 06:33

## 2025-05-02 RX ADMIN — SIMETHICONE 80 MG: 80 TABLET, CHEWABLE ORAL at 08:59

## 2025-05-02 RX ADMIN — ACETAMINOPHEN 975 MG: 325 TABLET ORAL at 15:49

## 2025-05-02 RX ADMIN — OXYCODONE 5 MG: 5 TABLET ORAL at 06:33

## 2025-05-02 RX ADMIN — OXYCODONE 5 MG: 5 TABLET ORAL at 12:20

## 2025-05-02 RX ADMIN — IBUPROFEN 800 MG: 800 TABLET, FILM COATED ORAL at 15:49

## 2025-05-02 RX ADMIN — ACETAMINOPHEN 975 MG: 325 TABLET ORAL at 04:14

## 2025-05-02 RX ADMIN — IBUPROFEN 800 MG: 800 TABLET, FILM COATED ORAL at 02:27

## 2025-05-02 ASSESSMENT — ACTIVITIES OF DAILY LIVING (ADL)
ADLS_ACUITY_SCORE: 24

## 2025-05-02 NOTE — LACTATION NOTE
This note was copied from a baby's chart.  Follow Up Consult    Infant Name: Kendrick    Infant's Primary Care Clinic: [facility]    Maternal Assessment: Lauren reports that her breasts are starting to feel full today.      Infant Assessment:  Kendrick has age appropriate output and weight loss.      Weight Change Since Birth: -7% at 2 day old      Feeding History: Lauren reports that breastfeeding is going well. Kendrick is able to sustain the latch for about 10-15 minutes at a time. She is breastfeeding on one side followed by hand expression and supplementing Kendrick with 10 mL of donor milk after each feeding.      Feeding Assessment: No feeding assessment done at this visit. Lauren asks about which nipple shield size is appropriate if needing to use it to aid with latching due to engorgement. Fitted her with a 20 mm nipple shield and reviewed how to apply it. Discussed other ways to help with engorgement and time management strategies while triple feeding. Lauren is hesitant to open up her double electric breast pump and plans to begin using her manual pump when she goes home today.    Addendum: called back to room to check latch. Lauren has Kendrick latched in football hold. She reports that the latch is comfortable. Kendrick has wide flanged lips and sustains the latch with coordinated, rhythmic sucking and intermittent audible swallows.    Education:   [x] Benefits of hand expression of colostrum  [x] Nutritive vs.non-nutritive sucking  [x] Gentle breast compressions as needed to enhance milk transfer  [x] How to tell when baby is finished  [x] Signs breastfeeding is going well (comfortable latch, audible swallows, age appropriate output and weight loss)    [x] Tips to prevent engorgement  [x] Signs of engorgement  [x] Tips to manage engorgement  [x] Pumping recommendations (based on patient need)  [x] Outpatient lactation resources    Handouts: Infant Feeding Log (Week 1, Your Guide to Postpartum &   Care Book) and [facility] Lactation Resources    Home Breast Pump: has pump at home    Plan: Continue breastfeeding every 2-3 hours with RN support as needed with a goal of 8-12 feedings per day. Watch for early feeding cues, but if too sleepy and no cues after 3 hours offer breast and go directly to supplementation if no interest.     Encourage frequent skin to skin. Due to infant prematurity, encourage hand expression after each feeding until milk is in and hands on pumping at least 6-8 times in 24 hours to help bring in full milk supply. Feed back any expressed milk and review order set for supplement recommendations. Continue giving expressed milk supplement until closer to expected due date, or as indicated with follow up lactation visits.      Family encouraged to follow up with outpatient lactation consultant at clinic within a week of discharge for support with LPT infant, help to monitor infant weight and milk transfer, establish supply & eventually wean from pumping and nipple shield if used. Family plans to follow up with Tereza ESCALANTE who they are familiar with from their last baby.       Ewa Gonzales RN, IBCLC   Lactation Consultant  Karthik: [facility] [phone]  Office: [phone]

## 2025-05-02 NOTE — PLAN OF CARE
Goal Outcome Evaluation:  Problem: Postpartum ( Delivery)  Goal: Optimal Pain Control and Function  Outcome: Progressing  Problem: Postpartum ( Delivery)  Goal: Absence of Infection Signs and Symptoms  Outcome: Progressing  Problem: Postpartum ( Delivery)  Goal: Successful Parent Role Transition  Outcome: Progressing   Overall Patient Progress: improvingOverall Patient Progress: improving    Outcome Evaluation: Patient is stable and pain is well managed with tylenol and motrin. She was able to shower independently, voiding and stooling with no issues. Breastfeeding and hand expressing with result. Will continue with plan of care.

## 2025-05-02 NOTE — PLAN OF CARE
Goal Outcome Evaluation:      Plan of Care Reviewed With: patient, spouse    Overall Patient Progress: improvingOverall Patient Progress: improving     Data: Vital signs within normal limits. Postpartum checks within normal limits - see flow record. Patient eating and drinking normally. Patient able to empty bladder independently and is up ambulating. No apparent signs of infection. Incision healing well. Patient performing self cares and is able to care for infant.  Action: Patient medicated during the shift for pain. See MAR. Patient reassessed within 1 hour after each medication and pain was improved - patient stated she was comfortable. Patient education done about pain management. See flow record.  Response: Positive attachment behaviors observed with infant. Support persons Max present.   Plan: Continue with plan of care.

## 2025-05-02 NOTE — DISCHARGE INSTRUCTIONS
Warning Signs after Having a Baby    Keep this paper on your fridge or somewhere else where you can see it.    Call your provider if you have any of these symptoms up to 12 weeks after having your baby.    Thoughts of hurting yourself or your baby  Pain in your chest or trouble breathing  Severe headache not helped by pain medicine  Eyesight concerns (blurry vision, seeing spots or flashes of light, other changes to eyesight)  Fainting, shaking or other signs of a seizure    Call 9-1-1 if you feel that it is an emergency.     The symptoms below can happen to anyone after giving birth. They can be very serious. Call your provider if you have any of these warning signs.    My provider s phone number: _______________________    Losing too much blood (hemorrhage)    Call your provider if you soak through a pad in less than an hour or pass blood clots bigger than a golf ball. These may be signs that you are bleeding too much.    Blood clots in the legs or lungs    After you give birth, your body naturally clots its blood to help prevent blood loss. Sometimes this increased clotting can happen in other areas of the body, like the legs or lungs. This can block your blood flow and be very dangerous.     Call your provider if you:  Have a red, swollen spot on the back of your leg that is warm or painful when you touch it.   Are coughing up blood.     Infection    Call your provider if you have any of these symptoms:  Fever of 100.4 F (38 C) or higher.  Pain or redness around your stitches if you had an incision.   Any yellow, white, or green fluid coming from places where you had stitches or surgery.    Mood Problems (postpartum depression)    Many people feel sad or have mood changes after having a baby. But for some people, these mood swings are worse.     Call your provider right away if you feel so anxious or nervous that you can't care for yourself or your baby.    Preeclampsia (high blood pressure)    Even if you  didn't have high blood pressure when you were pregnant, you are at risk for the high blood pressure disease called preeclampsia. This risk can last up to 12 weeks after giving birth.     Call your provider if you have:   Pain on your right side under your rib cage  Sudden swelling in the hands and face    Remember: You know your body. If something doesn't feel right, get medical help.     For informational purposes only. Not to replace the advice of your health care provider. Copyright 2020 [facility]. All rights reserved. Clinically reviewed by Leah Mcgill, RNC-OB, MSN. [employer] [id number] - Rev .     Section: What to Expect at Home  Your Recovery     A  section, or , is surgery to deliver your baby through a cut that the doctor makes in your lower belly and uterus. The cut is called an incision.  You may have some pain in your lower belly and need pain medicine for 1 to 2 weeks. You can expect some vaginal bleeding for several weeks. You will probably need about 6 weeks to fully recover.  It's important to take it easy while the incision heals. Avoid heavy lifting, strenuous activities, and exercises that strain the belly muscles while you recover. Ask a family member or friend for help with housework, cooking, and shopping.  This care sheet gives you a general idea about how long it will take for you to recover. But each person recovers at a different pace. Follow the steps below to get better as quickly as possible.  How can you care for yourself at home?  Activity       Rest when you feel tired. Getting enough sleep will help you recover.        Try to walk each day. Start by walking a little more than you did the day before. Bit by bit, increase the amount you walk. Walking boosts blood flow and helps prevent pneumonia, constipation, and blood clots.        Avoid strenuous activities, such as bicycle riding, jogging, weightlifting, and aerobic exercise, for 6 weeks  or until your doctor says it is okay.        Until your doctor says it is okay, do not lift anything heavier than your baby.        Do not do sit-ups or other exercises that strain the belly muscles for 6 weeks or until your doctor says it is okay.        Hold a pillow over your incision when you cough or take deep breaths. This will support your belly and decrease your pain.        You may shower as usual. Pat the incision dry when you are done.        You will have some vaginal bleeding. Wear sanitary pads. Do not douche or use tampons until your doctor says it is okay.        Ask your doctor when you can drive again.        You will probably need to take at least 6 weeks off work. It depends on the type of work you do and how you feel.        Ask your doctor when it is okay for you to have sex.   Diet       You can eat your normal diet. If your stomach is upset, try bland, low-fat foods like plain rice, broiled chicken, toast, and yogurt.        Drink plenty of fluids (unless your doctor tells you not to).        You may notice that your bowel movements are not regular right after your surgery. This is common. Try to avoid constipation and straining with bowel movements. You may want to take a fiber supplement every day. If you have not had a bowel movement after a couple of days, ask your doctor about taking a mild laxative.        If you are breastfeeding, limit alcohol. Alcohol can cause a lack of energy and other health problems for the baby when a breastfeeding woman drinks heavily. It can also get in the way of a mom's ability to feed her baby or to care for the child in other ways. There isn't a lot of research about exactly how much alcohol can harm a baby. Having no alcohol is the safest choice for your baby. If you choose to have a drink now and then, have only one drink, and limit the number of occasions that you have a drink. Wait to breastfeed at least 2 hours after you have a drink to reduce the  amount of alcohol the baby may get in the milk.   Medicines       Your doctor will tell you if and when you can restart your medicines. You will also get instructions about taking any new medicines.        If you stopped taking aspirin or some other blood thinner, your doctor will tell you when to start taking it again.        Take pain medicines exactly as directed.  If the doctor gave you a prescription medicine for pain, take it as prescribed.  If you are not taking a prescription pain medicine, ask your doctor if you can take an over-the-counter medicine.        If you think your pain medicine is making you sick to your stomach:  Take your medicine after meals (unless your doctor has told you not to).  Ask your doctor for a different pain medicine.        If your doctor prescribed antibiotics, take them as directed. Do not stop taking them just because you feel better. You need to take the full course of antibiotics.   Incision care       If you have strips of tape on the incision, leave the tape on for a week or until it falls off.        Wash the area daily with warm, soapy water, and pat it dry. Don't use hydrogen peroxide or alcohol, which can slow healing. You may cover the area with a gauze bandage if it weeps or rubs against clothing. Change the bandage every day.        Keep the area clean and dry.   Other instructions       If you breastfeed your baby, you may be more comfortable while you are healing if you don't rest your baby on your belly. Try tucking your baby under your arm, with your baby's body along the side you will be feeding on. Support your baby's upper body with your arm. With that hand you can control your baby's head to bring your baby's mouth to your breast. This is sometimes called the football hold.   Follow-up care is a key part of your treatment and safety. Be sure to make and go to all appointments, and call your doctor if you are having problems. It's also a good idea to know your  test results and keep a list of the medicines you take.  When should you call for help?  Share this information with your partner, family, or a friend. They can help you watch for warning signs.  Call 911  anytime you think you may need emergency care. For example, call if:       You feel you cannot stop from hurting yourself, your baby, or someone else.        You passed out (lost consciousness).        You have chest pain, are short of breath, or cough up blood.        You have a seizure.   Where to get help 24 hours a day, 7 days a week   If you or someone you know talks about suicide, self-harm, a mental health crisis, a substance use crisis, or any other kind of emotional distress, get help right away. You can:       Call the Suicide and Crisis Lifeline at 988.        Call 9-298-432-TALK (1-206.231.2502).        Text HOME to 042437 to access the Crisis Text Line.   Consider saving these numbers in your phone.  Go to [employer] for more information or to chat online.  Call your doctor or midwife now or seek immediate medical care if:       You have loose stitches, or your incision comes open.        You have signs of hemorrhage (too much bleeding), such as:  Heavy vaginal bleeding. This means that you are soaking through one or more pads in an hour. Or you pass blood clots bigger than an egg.  Feeling dizzy or lightheaded, or you feel like you may faint.  Feeling so tired or weak that you cannot do your usual activities.  A fast or irregular heartbeat.  New or worse belly pain.        You have symptoms of infection, such as:  Increased pain, swelling, warmth, or redness.  Red streaks leading from the incision.  Pus draining from the incision.  A fever.  Frequent or painful urination or blood in your urine.  Vaginal discharge that smells bad.  New or worse belly pain.        You have symptoms of a blood clot in your leg (called a deep vein thrombosis), such as:  Pain in the calf, back of the knee, thigh, or  "groin.  Swelling in the leg or groin.  A color change on the leg or groin. The skin may be reddish or purplish, depending on your usual skin color.        You have signs of preeclampsia, such as:  Sudden swelling of your face, hands, or feet.  New vision problems (such as dimness, blurring, or seeing spots).  A severe headache.        You have signs of heart failure, such as:  New or increased shortness of breath.  New or worse swelling in your legs, ankles, or feet.  Sudden weight gain, such as more than 2 to 3 pounds in a day or 5 pounds in a week.  Feeling so tired or weak that you cannot do your usual activities.        You had spinal or epidural pain relief and have:  New or worse back pain.  Increased pain, swelling, warmth, or redness at the injection site.  Tingling, weakness, or numbness in your legs or groin.   Watch closely for changes in your health, and be sure to contact your doctor or midwife if:       Your vaginal bleeding isn't decreasing.        You feel sad, anxious, or hopeless for more than a few days.        You are having problems with your breasts or breastfeeding.   Where can you learn more?  Go to https://www.[employer].net/patiented  Enter M806 in the search box to learn more about \" Section: What to Expect at Home.\"  Current as of: 2024  Content Version: 14.4    9421-8099 [employer].   Care instructions adapted under license by your healthcare professional. If you have questions about a medical condition or this instruction, always ask your healthcare professional. [employer] disclaims any warranty or liability for your use of this information.    "

## 2025-05-02 NOTE — PROGRESS NOTES
[facility]   Post-partum Note    Name:  Lauren Montague  MRN: [id number]    S: *** is feeling *** this morning. Her pain is well controlled*** on oral pain medications. She is ambulating without*** lightheadedness or dizziness. She is tolerating PO intake without nausea or vomiting.*** She is voiding spontaneously*** and has*** passed flatus. Lochia is ***. No headaches, vision changes, chest pain, shortness of breath, or RUQ pain. *** feeding, this is going ***.      O:   Patient Vitals for the past 24 hrs:   BP Temp Temp src Pulse Resp SpO2   25 1657 128/75 98.5  F (36.9  C) Oral 75 16 --   25 0851 118/62 98.6  F (37  C) Oral 77 16 --   25 0500 108/66 98  F (36.7  C) Oral 63 16 --   25 0100 114/72 98.8  F (37.1  C) Oral 63 16 96 %     Gen:  Resting comfortably, NAD  CV:  RR, well perfused  Pulm:  Regular work of breathing  Abd:  Soft, appropriately ttp,  distended. Fundus at umbilicus, firm and non-tender. Incision covered with bandage, surrounding skin c/d/I    I/O last 3 completed shifts:  In: 1400 [P.O.:1400]  Out: 1950 [Urine:1950]    Hgb:   Hemoglobin   Date Value Ref Range Status   2025 11.5 (L) 11.7 - 15.7 g/dL Final     Platelet Count   Date Value Ref Range Status   2025 69 (L) 150 - 450 10e3/uL Final       Assessment/Plan:  Lauren Montague 34 year old  on POD #2 s/p scheduled RLTCS. VSS. Early postoperative period, starting to meet goals for discharge.     #Postpartum management  Pain: Well-controlled with ibuprofen, tylenol, and oxycodone PRN  Hgb: Hgb 13.6>  (TXA) > 11.5.   Rh: Positive   Rubella: Immune   GI: PRN bowel regimen and antiemetics, simethicone PRN.  : S/p guadarrama, voiding spontaneously  Feed: Breastfeeding is going well  BC: Declines for now. Will discuss at 6w visit.    # Chronic TCP  - plt 69. Repeat stable at 69. toradol held but still receiving ibuprofen  - lovenox indicated due to BMI + CS, but held given  platelet count      #Epilepsy  - continue PTA Keppra  - level ordered today and normal at 31  - discussed plan -- Per neuro (scanned letter) Decrease Keppra to 1000mg PO BID 1w pp and Keppra level 1w after that     Medically Ready for Discharge: Anticipated Today    ***

## 2025-05-02 NOTE — PLAN OF CARE
Problem: Adult Inpatient Plan of Care  Goal: Optimal Comfort and Wellbeing  Intervention: Monitor Pain and Promote Comfort  Recent Flowsheet Documentation  Taken 5/2/2025 1009 by Mackenzie Herman, RN  Pain Management Interventions: medication (see MAR)  Taken 5/2/2025 0858 by Mackenzie Herman, RN  Pain Management Interventions: medication (see MAR)   Goal Outcome Evaluation:      Plan of Care Reviewed With: patient    Overall Patient Progress: improvingOverall Patient Progress: improving

## 2025-05-02 NOTE — PROGRESS NOTES
Summary:  VSS.  Assessments WNL.  Thromboctopenia - plts 69.  Pt is comfortable this morning with scheduled tylenol and ibuprophen. Had oxycodone and bendaryl overnight.  Reviewed prn meds and pt will let me know if she needs additional.  Voiding without difficult.  Reports +flatus and having had x BMs yesterday.  Requested x1 senna this morning.  No dizziness, visions changes, epigastric pain, headache.  Ambulating without difficulty.  Kendrick is LPT- feels breast feeding is going well.  Asked for LC to see re:  nipple shield size if she wants to use while at home.  Reports has pump at home.  Seen by Dr Crawford.  Plans on a discharge to home late afternoon.  Spouse supportive at bedside.  EDS/BC submitted.  Call light is within reach for assistance.    Pt requesting to leave at 1300 and then requested to stay longer.  Discharge instructions and meds are pending.  Seen by LC this afternoon.  Pt will put on call light when she is ready for discharge to home.    Discharge to home at 1712.  Pt declined VS/assessments prior to discharge.  Reports feeling stable and comfortable.  Discharge meds given - pt states understanding of meds and AVS.  Updated AVs in room with last tylenol/motrin given prior to discharge to home.  Pt states understanding of when to seek care, follow up, resources. NB ID bands checked/matched.  States readiness for discharge to home now and that all questions have been answered and belongings are with her.  Discharge to home with Kendrick and spouse and sister in stable/comfortable condition.  Transport to escort from hospital.

## 2025-05-05 ENCOUNTER — TELEPHONE (OUTPATIENT)
Dept: OBGYN | Facility: CLINIC | Age: 35
End: 2025-05-05
Payer: COMMERCIAL

## 2025-05-05 NOTE — TELEPHONE ENCOUNTER
PAPERWORK REQUEST    Form received on: 05/05/2025  How was form received: In Person  Preferred Provider: Natali Crawford MD  Type of form: FMLA  Date needed by: 5-7 days  What to do with form once completed: Please fax to [phone]  Where was form placed?: [facility] basket  Has an CHIRAG been signed? Not Applicable    Additional Notes:

## 2025-05-07 NOTE — ANESTHESIA POSTPROCEDURE EVALUATION
Patient: Lauren Montague    Procedure: Procedure(s):  REPEAT  SECTION       Anesthesia Type:  Spinal, General    Note:  Disposition: Admission   Postop Pain Control: Uneventful            Sign Out: Well controlled pain   PONV: No   Neuro/Psych: Uneventful            Sign Out: Acceptable/Baseline neuro status   Airway/Respiratory: Uneventful            Sign Out: Acceptable/Baseline resp. status   CV/Hemodynamics: Uneventful            Sign Out: Acceptable CV status; No obvious hypovolemia; No obvious fluid overload   Other NRE: NONE   DID A NON-ROUTINE EVENT OCCUR? No           Last vitals:  Vitals Value Taken Time   /75 25 11:30   Temp     Pulse 77 25 11:30   Resp 16 25 11:30   SpO2 98 % 25 11:30       Electronically Signed By: Raven Urban MD  May 7, 2025  2:04 PM

## 2025-05-08 ENCOUNTER — LAB (OUTPATIENT)
Dept: LAB | Facility: CLINIC | Age: 35
End: 2025-05-08
Payer: COMMERCIAL

## 2025-05-08 DIAGNOSIS — G40.909 SEIZURE DISORDER (H): ICD-10-CM

## 2025-05-08 LAB — LEVETIRACETAM SERPL-MCNC: 28.5 ÂΜG/ML (ref 10–40)

## 2025-05-13 ASSESSMENT — EDINBURGH POSTNATAL DEPRESSION SCALE (EPDS)
I HAVE BEEN SO UNHAPPY THAT I HAVE HAD DIFFICULTY SLEEPING: NOT VERY OFTEN
TOTAL SCORE: 4
I HAVE FELT SAD OR MISERABLE: NOT VERY OFTEN
I HAVE FELT SCARED OR PANICKY FOR NO GOOD REASON: NO, NOT AT ALL
THE THOUGHT OF HARMING MYSELF HAS OCCURRED TO ME: NEVER
THINGS HAVE BEEN GETTING ON TOP OF ME: NO, MOST OF THE TIME I HAVE COPED QUITE WELL
I HAVE BLAMED MYSELF UNNECESSARILY WHEN THINGS WENT WRONG: NO, NEVER
I HAVE BEEN ABLE TO LAUGH AND SEE THE FUNNY SIDE OF THINGS: AS MUCH AS I ALWAYS COULD
I HAVE LOOKED FORWARD WITH ENJOYMENT TO THINGS: AS MUCH AS I EVER DID
I HAVE BEEN ANXIOUS OR WORRIED FOR NO GOOD REASON: HARDLY EVER
I HAVE BEEN SO UNHAPPY THAT I HAVE BEEN CRYING: NO, NEVER

## 2025-05-13 NOTE — PROGRESS NOTES
"Assessment:    Two week old late  infant now at corrected age of 38w5d, gaining weight well on breastfeeding with minimal supplementation  Good latch and suck, with milk transfer appropriate to baby's needs during observed feeding in office today  Mother with ample milk supply    Plan:   Use good positioning for deep latch, with baby held close to body and baby's head/shoulders/hips in good alignment.  When in a seated position, use a pillow to help bring baby close to breasts, and stepstool to elevate your knees above hips.    When bringing your baby to your breast, compress your breast vertically and in line with baby's mouth--this will help them to get a larger mouthful of breast and a deeper latch. Babies latch best to the breast by bringing their chin in first, so point your nipple towards baby's nose, tuck the chin in close, and then wait for his mouth to open.  When his mouth opens, bring his head in deeply.  Baby's chin should be snugged deeply in your breast, their upper cheeks should be touching the breast, and their nose just out of the breast.   For the fast milk letdown that may make difficult for Kendrick to stay latched to the breast or causes them to cough/sputter while nursing, try using the laid-back or sidelying nursing positions.   You can also try taking baby off of breast when the strong milk letdown starts, and allow milk to flow out into burp cloth, re-latching baby after flow has slowed.   You can use a rolled burp cloth or blanket under your breast for support, so that you do not need to hold your breast up for the whole feeding.  Another idea is using a scarf or long piece of cloth around your neck and beneath your breast to make a sort of \"breast sling.\"   Continue to nurse baby on cue, 8-12 times each day.  Once your baby has returned to their birthweight, you can trust them to awaken when they need to eat--you do not need to awaken them on a schedule.  When you nurse, feed on one " "side until baby finishes swallowing.  Once swallowing slows, use breast compression to encourage more intake, but once baby is sleeping, coming off the breast, or just \"nibbling,\" you can take baby off the breast and move to the other side.  Offer both breasts at each feeding.     Kendrick needs about 18 oz of milk each day to grow well.  If he nurses at home as he did in the office today, he is getting plenty of milk and does not need any extra in bottles after feeding.  You do not need to pump on a regular basis.  Just pump (or use a milk-catcher) if you would like to have some milk to offer in a bottle.  For example, if you would like to have Kendrick get a bottle of 3 oz, then just pump 3 oz.  If you find you become very full and uncomfortable between feedings, pump or hand-express just a small amount;  just enough to relieve your fullness and allow you to remain comfortable until the next feeding (like an ounce).  Do not pump to completely \"empty\" your breast, because this will encourage an increase in milk supply, which will just worsen the sensations of fullness.      Follow up with lactation as needed, and pediatric provider as planned.  [employer] can be used for brief questions, but it's important to know that messages are not seen Friday through Sunday. If urgent help is needed, Monday through Friday you can call [phone] and one of our lactation consultants will get the message and respond; if you need a rapid response over a weekend or holiday, it is best to call your on-call maternity or pediatric provider.  Please feel free to schedule a return visit if the concern is more detailed;  telephone visits are also an option if you don't feel you need to be seen in person.     Subjective: Criss are here today with the following concerns:  Check of weight gain and milk transfer: they had been supplementing after most feedings with 20 - 30 ml, primarily because of initial weight loss and hypoglycemia; " " have recently begun to decrease this as Kendrick seems more content after most feedings.  Would like to know if supplementation needs to be continued  Pumping:  using Pumpables hybrid-type pump about twice/day, yielding about 3 oz/session.  Uses suction-based milk catcher about 6 times/day, yielding about 12 oz/day.      She is vaccinated for Covid-19, with most recent dose 10/18/24    Hospital Course: Planned   without complications.  Baby had some hypoglycemia in hospital so started supplementation.  Seen by hospital IBCLC for routine support;  doing well.    Mother's Relevant Med/Surg History: Epilepsy well controlled on Keppra;  hx myomectomy requiring planned  ; anxiety/depression    Breastfeeding Goals: continued exclusive breastfeeding    Previous Breastfeeding Experience:  First baby was very sleepy and had some initial slow weight gain requiring \"triple feeding\" for several weeks.  After initial weeks, baby did very well and continued breastfeeding without difficulty for one year.     Infant's name: Kendrick  Infant's bday: [birth year]  Gestational age: 36w5d  Infant's birth weight: 6 # 10.2 oz   Discharge weight: 6 # 2.6 oz     [facility]  Lauren gives her permission for today's note to be forwarded to [facility].  CHIRAG signed and filed in Lauren's chart as Kendrick has no local active pediatric chart.   Recent weights:  25:  6 # 5 oz       f Harvey Pp Depresion Scale    2025  3:01 PM CDT - Filed by Patient   I have been able to laugh and see the funny side of things. As much as I always could   I have looked forward with enjoyment to things. As much as I ever did   I have blamed myself unnecessarily when things went wrong. No, never   I have been anxious or worried for no good reason. Hardly ever   I have felt scared or panicky for no good reason. No, not at all   Things have been getting on top of me. No, most of the time I have coped quite " well   I have been so unhappy that I have had difficulty sleeping. Not very often   I have felt sad or miserable. Not very often   I have been so unhappy that I have been crying. No, never   The thought of harming myself has occurred to me. Never   [facility] [place] Total Score (range: 0 - 30) 4       Peq Lactation Visit Questionnaire    5/13/2025  3:11 PM CDT - Filed by Patient   What is your main concern today? Baby weight gain, weighted feed. Plans for continuing supplementation   Your baby's first name: Kendrick   Your baby's last name: Eddi   Baby's most recent weight: 2.86kg   Date: 5/8/2025   Since your last visit, have you had any new medical problems or surgeries? Yes   Please explain: C section   How often does your baby eat? Every 2-3 hours   How long does each feeding last?  20min   How much of the time does your baby take both breasts when nursing? Most times, bottle sometimes   Can you hear the baby swallowing during nursing? Yes   How many times does your baby feed in 24 hours? 12   How many times does your baby urinate (pee) in 24 hours? 9   How many stools (poops) does your baby have in 24 hours? 6   Describe the color and consistency of the poop: Yellow grainy   Do you give your baby extra milk in addition to or instead of breastfeeding? Breastmilk   How much extra do you usually give? 20-30ml   How do you give extra milk? Bottle   Are you pumping your breasts? Yes   How often? 2-4 times a day   How much is pumped? 1.5-3 oz per pumping session   What else would you like the lactation consultant to know? Seems like baby might be choking at first during feeds, also using milk catchers on opposite breast when feeding.        Objective/Physical exam:   Her nipples are everted, the areola is compressible, the breast is soft and full.     Sore nipples: no     Assessment of infant: 30.36% Weight for age percentile   Age today: 2 weeks, corrected age 38w5d  Today's weight: 6 # 12.4 oz  Amount of milk  transferred: 7 pounds    Baby has full flexion of arms and legs, normal tone, behavior is alert and active, respirations are normal, skin is normal, hydration is normal, jaw is normal size and alignment, palate is normal, frenulum is normal, baby can lateralize tongue, has adequate tongue lift, and tongue can protrude past bottom gum line. Upper labial frenulum is normal.    Suck exam:  Baby has strong, coordinated suck with good  tongue cupping    Baby thrush: none    Jaundice: none      Feeding assessment: Baby can hold suction with tongue while at the breast.     Alignment: The baby was flex relaxed. Baby's head was aligned with its trunk. Baby did face mother. Baby was in cross cradle position today.   Areolar Grasp: Baby was able to open mouth widely. Baby's lips were not pursed. Baby's lips did flange outward. Tongue was visible over bottom gum. Baby had complete seal.     Areolar Compression: Baby made rhythmic motion. There were no clicking or smacking sounds. There was no severe nipple discomfort. Nipples appeared rounded after feeding.  Audible swallowing: Baby made quiet sounds of swallowing: There was an increase in frequency after milk ejection reflex. The milk ejection reflex is normal and milk supply is normal.     /64   Pulse 78   Wt 95.1 kg (209 lb 9.6 oz)   LMP 2024   SpO2 98%   Breastfeeding Yes   BMI 38.34 kg/m    OB History    Para Term  AB Living   2 2 1 1 0 2   SAB IAB Ectopic Multiple Live Births   0 0 0 0 2      # Outcome Date GA Lbr [place]/2nd Weight Sex Type Anes PTL Lv   2  25 36w5d  3.01 kg (6 lb 10.2 oz) M CS-LTranv Spinal  LATISHA      Name: Anuj-Gael Montague      Apgar1: 9  Apgar5: 9   1 Term 22 41w1d  3.629 kg (8 lb) M CS-LTranv Gen N LATISHA      Name: ANUJ MONTAGUE-GAEL      Apgar1: 10  Apgar5: 10       Current Outpatient Medications:     acetaminophen (TYLENOL) 325 MG tablet, Take 2 tablets (650 mg) by mouth every 6 hours as needed for mild  pain. Start after Delivery., Disp: 100 tablet, Rfl: 0    folic acid (FOLVITE) 1 MG tablet, Take 1 mg by mouth 2 times daily., Disp: , Rfl:     ibuprofen (ADVIL/MOTRIN) 600 MG tablet, Take 1 tablet (600 mg) by mouth every 6 hours as needed for moderate pain. Start after delivery, Disp: 60 tablet, Rfl: 0    levETIRAcetam (KEPPRA) 500 MG tablet, Take 2 tablets (1,000 mg) by mouth 2 times daily., Disp: , Rfl:     loratadine (CLARITIN) 10 MG tablet, Take 10 mg by mouth every morning., Disp: , Rfl:     oxyCODONE (ROXICODONE) 5 MG tablet, Take 1 tablet (5 mg) by mouth once as needed for severe pain., Disp: 6 tablet, Rfl: 0    Prenatal Vit-Fe Fumarate-FA (PRENATAL MULTIVITAMIN W/IRON) 27-0.8 MG tablet, Take 1 tablet by mouth every morning., Disp: , Rfl:     Vitamin D, Cholecalciferol, 25 MCG (1000 UT) CAPS, Take 2,000 Units by mouth every morning., Disp: , Rfl:   Past Medical History:   Diagnosis Date    Motion sickness     PONV (postoperative nausea and vomiting)     Seizures (H)     Epilepsy  Last      Past Surgical History:   Procedure Laterality Date     SECTION N/A 2022    Procedure:  SECTION;  Surgeon: Yo Lindquist MD;  Location: [facility]     SECTION N/A 2025    Procedure: REPEAT  SECTION;  Surgeon: Natali Crawford MD;  Location:  [facility]    DILATION AND CURETTAGE, OPERATIVE HYSTEROSCOPY, COMBINED N/A 2021    Procedure: HYSTEROSCOPY, REMOVAL OF INTRAUTERINE DEVICE;  Surgeon: Yo Lindquist MD;  Location: [facility]    MYOMECTOMY ABDOMINAL APPROACH  2013    Large fundal fibroid.  Labor contraindicated     Family History   Problem Relation Age of Onset    No Known Problems Mother     No Known Problems Father     No Known Problems Maternal Grandmother     No Known Problems Maternal Grandfather     No Known Problems Paternal Grandmother     No Known Problems Paternal Grandfather     No Known Problems Brother     No Known Problems  Sister     No Known Problems Son        Time spent on day of service:    Face-to-face visit:   34 min   Documentation:  9 min   Total time spent: 43 min    CHERIE Gastelum, CNM, IBCLC

## 2025-05-14 ENCOUNTER — OFFICE VISIT (OUTPATIENT)
Dept: OBGYN | Facility: CLINIC | Age: 35
End: 2025-05-14
Payer: COMMERCIAL

## 2025-05-14 VITALS
BODY MASS INDEX: 38.34 KG/M2 | DIASTOLIC BLOOD PRESSURE: 64 MMHG | HEART RATE: 78 BPM | WEIGHT: 209.6 LBS | OXYGEN SATURATION: 98 % | SYSTOLIC BLOOD PRESSURE: 100 MMHG

## 2025-05-14 DIAGNOSIS — Z87.51 HISTORY OF PRETERM DELIVERY: Primary | ICD-10-CM

## 2025-05-14 DIAGNOSIS — O92.79 OTHER DISORDERS OF LACTATION: ICD-10-CM

## 2025-05-14 PROCEDURE — 3074F SYST BP LT 130 MM HG: CPT | Performed by: ADVANCED PRACTICE MIDWIFE

## 2025-05-14 PROCEDURE — 99215 OFFICE O/P EST HI 40 MIN: CPT | Performed by: ADVANCED PRACTICE MIDWIFE

## 2025-05-14 PROCEDURE — 3078F DIAST BP <80 MM HG: CPT | Performed by: ADVANCED PRACTICE MIDWIFE

## 2025-05-14 NOTE — PATIENT INSTRUCTIONS
"   Use good positioning for deep latch, with baby held close to body and baby's head/shoulders/hips in good alignment.  When in a seated position, use a pillow to help bring baby close to breasts, and stepstool to elevate your knees above hips.    When bringing your baby to your breast, compress your breast vertically and in line with baby's mouth--this will help them to get a larger mouthful of breast and a deeper latch. Babies latch best to the breast by bringing their chin in first, so point your nipple towards baby's nose, tuck the chin in close, and then wait for his mouth to open.  When his mouth opens, bring his head in deeply.  Baby's chin should be snugged deeply in your breast, their upper cheeks should be touching the breast, and their nose just out of the breast.   For the fast milk letdown that may make difficult for Kendrick to stay latched to the breast or causes them to cough/sputter while nursing, try using the laid-back or sidelying nursing positions.   You can also try taking baby off of breast when the strong milk letdown starts, and allow milk to flow out into burp cloth, re-latching baby after flow has slowed.   You can use a rolled burp cloth or blanket under your breast for support, so that you do not need to hold your breast up for the whole feeding.  Another idea is using a scarf or long piece of cloth around your neck and beneath your breast to make a sort of \"breast sling.\"   Continue to nurse baby on cue, 8-12 times each day.  Once your baby has returned to their birthweight, you can trust them to awaken when they need to eat--you do not need to awaken them on a schedule.  When you nurse, feed on one side until baby finishes swallowing.  Once swallowing slows, use breast compression to encourage more intake, but once baby is sleeping, coming off the breast, or just \"nibbling,\" you can take baby off the breast and move to the other side.  Offer both breasts at each feeding.     Kendrick needs " "about 18 oz of milk each day to grow well.  If he nurses at home as he did in the office today, he is getting plenty of milk and does not need any extra in bottles after feeding.  You do not need to pump on a regular basis.  Just pump (or use a milk-catcher) if you would like to have some milk to offer in a bottle.  For example, if you would like to have Kendrick get a bottle of 3 oz, then just pump 3 oz.  If you find you become very full and uncomfortable between feedings, pump or hand-express just a small amount;  just enough to relieve your fullness and allow you to remain comfortable until the next feeding (like an ounce).  Do not pump to completely \"empty\" your breast, because this will encourage an increase in milk supply, which will just worsen the sensations of fullness.      Follow up with lactation as needed, and pediatric provider as planned.  [employer] can be used for brief questions, but it's important to know that messages are not seen Friday through Sunday. If urgent help is needed, Monday through Friday you can call [phone] and one of our lactation consultants will get the message and respond; if you need a rapid response over a weekend or holiday, it is best to call your on-call maternity or pediatric provider.  Please feel free to schedule a return visit if the concern is more detailed;  telephone visits are also an option if you don't feel you need to be seen in person.   _____________      If you find that you are having a lot of uncomfortable leaking, you can just use a hand to put some pressure on your areolar area and this will stop the flow.  If you would like to collect the milk that is released with letdown without encouraging more supply, consider a type of  that does not use suction to stay in place.  The [employer]aa Brandee, Anita Catch, and [employer]inc Milk-Saver can all be used in this way.  Alternatively, if you would like to use a  to gather enough milk for a bottle, you " "could use a type that stays in place with suction and draws out more milk.  The best way to use these is to nurse your baby on one side, and then when you move baby to the second side, place the  on the first side.  In this way baby always gets \"first choice.\"  Just collect the amount of milk that you will use.     ___________    For good videos on the laid-back breastfeeding position:  Www.naturalbreastfeeding.[place]  Www.[employer].[place]     Video of sidelying breastfeeding  https://[email or web address].com/watch?[email or web address]   "

## 2025-05-27 ENCOUNTER — TELEPHONE (OUTPATIENT)
Dept: OBGYN | Facility: CLINIC | Age: 35
End: 2025-05-27
Payer: COMMERCIAL

## 2025-05-27 NOTE — TELEPHONE ENCOUNTER
[facility] Call Center    Phone Message    May a detailed message be left on voicemail: yes     Reason for Call: Other: Pt is requesting a call back from a nurse. Pt states she is having skin concerns around her  scar. Please advise.     Action Taken: Other: OBGYN    Travel Screening: Not Applicable     Date of Service:

## 2025-05-27 NOTE — TELEPHONE ENCOUNTER
"25     Patient states her incision looks good but has a skin shelf and has concerns about the skin fold and skin around incision.  The skin itself seems irritated and \"breaking down\" - odorous   No open areas but red and irritated    Moisture getting stuck in fold  She has been folding up panty-liner pad and placing in fold to help with moisture  Discoloration on pad- brownish - not blood.  No itching, does not seem fungal.    Discussed this sounds common with  shelf and often due to moisture being trapped.  Discussed keeping clean dry and letting it be exposed to air if possible.    Patient reiterates she has no concerns with the incision itself.    Discussed interdry if she wanted to look into that  Discussed if incision is fully healed and scab has fallen off could look into scar tape as it can act as a barrier.    Wondering if there is any powder or recommendations for her from provider.    Please advise.    Emelyn Mak RN        "

## 2025-05-30 NOTE — PROGRESS NOTES
"    [facility]  [facility] [address], [address], [place] [address]  Main: [phone], Fax: [phone]    Video Virtual Visit Note:    Patient: Lauren Montague  MRN: [id number]  : 1990  VANGIE: 2025  Location of the patient when this video visit is conducted: Patient's home  Location of this writer at the time of this video visit is conducted: [facility].     Due to the ongoing COVID-19 outbreak, this visit was conducted by video, with the patient's approval.    Reason of today's visit:  Thrombocytopenia.      Clinical History Summary:  Lauren is a 34 year old  female with a history of seizures, who is under good control with Keppra, who also has a history of chronic thrombocytopenia as well as a family history of thrombocytopenia, who delivered her child on 2025 via , participates in today's video visit for post partum follow up. Lauren also has a history of Myomectomy in the fundal area and thus her last (first) child was delivered via  on 2022. Lauren was last seen by this writer back on 3/27/2025.      Her thrombocytopenia history summary:  Lauren has a history of chronic thrombocytopenia with her platelet count at baseline around 120K. This is evidence on her historical records dated back to 2013 where her platelet count at the time was 120K. Between  to Dec 2021, her platelet count ranges from 110K to 131K.   Lauren underwent abdominal myomectomy back in .  Then she became pregnant with her first child back at the end of .   2022, she underwent \"repeat \" via her previous Myomectomy (large fundal fibroid) incision to deliver her baby at 41w1d. At the time, her platelet count was 64K and the decision was made to have her undergo general anesthesia. From what I can tell, there was no immediate bleeding complications " and she did not require any platelet transfusions though it was prepared. The  was uneventful and she was discharged on 2022 without any bleeding complications.  2024, she established care with this writer when she was pregnant at 19 weeks gestation with her current pregnancy. At the time the plan was to have her deliver the baby via repeat  at around 37-38 weeks gestation. Her Ob/Gyn, Dr. Crawford referred her to our clinic for consultation about her chronic thrombocytopenia. Her 10/10/2024 platelet count was 125K.   Lauren reported a family history of chronically low platelet counts including her father, brother, and paternal cousins. Both her father's and her brother's platelet count are reportedly at around 100K and they never experienced any issues with bleeding. One of her paternal cousins reportedly required general anesthesia for her  delivery due to low platelet in the past. Lauren's son (who is currently around 2 years of age, also reportedly was found to have mildly low platelet when they were tested at 1 years of age. Her son has no issues with bleeding diathesis.   2024, she established care with this writer. At the time, I proceeded with some additional labs including VWF profile, repeat CBC with differentials, peripheral blood smear morphology, platelet function screening test, platelet aggregation study and platelet electron microscopy. Basically all of these tests came back negative or within normal range. Her platelet count has remained around 90-120K in the past several months. This writer also provided with an antepartum monitoring plan as well as a plan at time of delivery. I was planning to see her back 1 month after she has delivered her child.      Interim History:  2025, she underwent scheduled  delivery. On 2025, her platelet count was 69K. In review of her operative note, she received tranexamic acid for the . No  platelet transfusion was given. She had an estimated blood loss of 394 mL. Some adhesions. No postpartum bleeding complications and was discharged on 5/2/2025. PLT on 5/1/2025 was 69K.    Today, her and her baby are doing well. Denies any bleeding issues. She reports that her and her  have not decide if they would like more children at this time but for sure not in the near future.     Today, she states that her father's brother also has thrombocytopenia and apparently her paternal cousin, who also has chronic thrombocytopenia had some extensive testing done in the past but unfortunately Lauren does not have much details.     ROS:  Denies any bleeding complications. Specifically, no frequent epistaxis. No issues with oral mucosal bleeding. Denies any hematuria or blood in stools. Denies any shortness of breath. No chest pain. No cough. No fever.      Medications:   Current Outpatient Medications   Medication Sig Dispense Refill    acetaminophen (TYLENOL) 325 MG tablet Take 2 tablets (650 mg) by mouth every 6 hours as needed for mild pain. Start after Delivery. 100 tablet 0    folic acid (FOLVITE) 1 MG tablet Take 1 mg by mouth 2 times daily.      ibuprofen (ADVIL/MOTRIN) 600 MG tablet Take 1 tablet (600 mg) by mouth every 6 hours as needed for moderate pain. Start after delivery 60 tablet 0    levETIRAcetam (KEPPRA) 500 MG tablet Take 2 tablets (1,000 mg) by mouth 2 times daily.      loratadine (CLARITIN) 10 MG tablet Take 10 mg by mouth every morning.      oxyCODONE (ROXICODONE) 5 MG tablet Take 1 tablet (5 mg) by mouth once as needed for severe pain. 6 tablet 0    Prenatal Vit-Fe Fumarate-FA (PRENATAL MULTIVITAMIN W/IRON) 27-0.8 MG tablet Take 1 tablet by mouth every morning.      Vitamin D, Cholecalciferol, 25 MCG (1000 UT) CAPS Take 2,000 Units by mouth every morning.       No current facility-administered medications for this visit.       Allergies:   Allergies   Allergen Reactions    Morphine Nausea and  Vomiting    Penicillins Swelling and Other (See Comments)     PN: swelled up    Dust Mites Other (See Comments)    Cat Dander Unknown, Itching and Rash    Pollen Extract Itching and Rash        PMH:   Past Medical History:   Diagnosis Date    Motion sickness     PONV (postoperative nausea and vomiting)     Seizures (H)     Epilepsy  Last        Social History:   Deferred    Family History:  Deferred    Objective:  Visual Examination via Video:  Pleasant in no acute distress.  Normal work of breathing   A+O x 3    Labs:  Reviewed and are as described above.     Assessment:  In summary, Lauren is a 34 year old  female with a history of seizures, who is under good control with Keppra, who also has a history of chronic thrombocytopenia as well as a family history of thrombocytopenia, participating in today's video visit to discuss her lab results done in the past several months and to finalize her labor and delivery plan.      As mentioned above, she has had chronic thrombocytopenia at least since  with her baseline platelet count around 120K. She has had no bleeding diathesis despite having her platelet count dropped down to the 60s at the time of her  delivery (under general anesthesia) of her first child back in . She also has no lifelong history of bleeding diathesis.      The differential diagnosis of thrombocytopenia is broad which include but not limited to:  Chronic ITP.   Congenital thrombocytopenia. Considering that she also has a family history of thrombocytopenia, this diagnosis is rather high on the list.   Type 2B Von Willebrand Disease. No evidence of this with labs done in Dec 2024.   Medication induced thrombocytopenia. As far as I understand, thrombocytopenia is not a common side effect of Keppra, thus I do not feel that Keppra is the cause of her chronic thrombocytopenia.   Pregnancy induced thrombocytopenia. This is also unlikely as she has had thrombocytopenia prior to  her ever becoming pregnant.   Artificial platelet clumping such as reaction to EDTA within the blood collection tubes. Peripheral blood smear did not show platelet clumping in Dec 2024.   Giant platelet syndrome. Peripheral blood smear showed no evidence of this in Dec 2024.     Diagnosis:  Chronic thrombocytopenia. Likely congenital thrombocytopenia. With her baseline Platelet count runs around 100-130K. This tend to decrease to around 60-90K during pregnancy.   Family history of thrombocytopenia.     Plan:  From a management standpoint, she does not require any routine hematological treatments from a day to day activities perspective. For future surgery or procedures, she likely will not need any specific hematological treatments as her historic baseline platelet counts were >100K.     Lauren was previously suspected of having ITP. Identifying an alternate diagnosis for Lauren via genetic sequencing would both clarify her diagnosis and also would avoid her potentially being treated with medications for ITP that will be of no benefit to her. Furthermore, some of the congenital platelet disorders can be associated with other clinical features that are outside of the coagulation system for which screening and condition-specific treatments would be recommended. Thus at this time, it is my recommendation that she is to proceed with genetic sequencing testing for congenital thrombocytopenia. I will have our clinic's genetic counselor reach out to her to discuss proceeding with testing.     At this time, I do not have any plans to see her back on a routine basis as she has had no history of bleeding diathesis.       Video-Visit Details:  Type of service:  Video Visit  Video Start Time: 11:30  Video End Time (time video stopped): 11:45  Originating Location (pt. Location): Home  Distant Location (provider location):  [facility]   Mode of Communication:  Video Conference  via [facility]      Rinku Melton PA-C, MPAS  Physician Assistant  [facility].     20 minutes spent by me on the date of the encounter doing chart review, history and exam, documentation and further activities per the note

## 2025-06-12 ENCOUNTER — TELEPHONE (OUTPATIENT)
Dept: HEMATOLOGY | Facility: CLINIC | Age: 35
End: 2025-06-12

## 2025-06-12 ENCOUNTER — VIRTUAL VISIT (OUTPATIENT)
Dept: HEMATOLOGY | Facility: CLINIC | Age: 35
End: 2025-06-12
Attending: PHYSICIAN ASSISTANT
Payer: COMMERCIAL

## 2025-06-12 DIAGNOSIS — Z83.2 FAMILY HISTORY OF THROMBOCYTOPENIA: ICD-10-CM

## 2025-06-12 DIAGNOSIS — D69.6 THROMBOCYTOPENIA: Primary | ICD-10-CM

## 2025-06-12 NOTE — PATIENT INSTRUCTIONS
Lauren,    It was nice to see you earlier today via video visit.     As discussed, I will have Qing Shelton [facility] (genetic counselor) reach out to you to discuss proceeding with congenital thrombocytopenia genetic testing. If you are able to get some information about what kind of testing that your paternal cousin has gotten done in the past, that might be very helpful too for Qing.     Please call your nearest [facility] to make a lab only appointment to get your CBC rechecked. I will contact you via [employer] once the result is back.     At this time, I have no further plans to see you back on a routine basis. But please feel free to reach out if you should have any further questions or concerns or if you should experience any bleeding issues. Our phone number is [phone] and ask to speak to a nursing staff.    Thank you once again in choosing our clinic as part of your healthcare team.      Rinku Melton PA-C, MPAS  Physician Assistant  [facility].

## 2025-06-24 ENCOUNTER — VIRTUAL VISIT (OUTPATIENT)
Dept: HEMATOLOGY | Facility: CLINIC | Age: 35
End: 2025-06-24
Attending: PHYSICIAN ASSISTANT
Payer: COMMERCIAL

## 2025-06-24 DIAGNOSIS — D69.6 THROMBOCYTOPENIA: Primary | ICD-10-CM

## 2025-06-24 NOTE — PROGRESS NOTES
"2025    Presenting Information:   Lauren Montague was seen for genetic counseling through [facility] today. Today's visit was conducted by telephone. I met with her per the request of Rinku Melton PA-C to obtain a family history and to discuss the genetics of thrombocytopenia.     Personal History:   Briefly, Lauren is a 35 year old woman with chronic thrombocytopenia. Please see Rinku Melton PA-C's note from 3/27/2025 for additional details regarding her personal history.     Family History:   A three generation pedigree, specific to thrombocytopenia was obtained today and scanned into the EMR. The following information is significant:   One son (2.5 years old) has thrombocytopenia  One son (2 months old) does not have thrombocytopenia  Brother has thrombocytopenia. He does not have children.  Sister may have thrombocytopenia. She does not have children  Father has thrombocytopenia  Paternal uncle has thrombocytopenia.   He has 3 daughters (Lauren's cousins). One of these 3 cousins has thrombocytopenia. The other two may have thrombocytopenia.   Paternal aunt may have thrombocytopenia  She has one daughter who may have thrombocytopenia.   Paternal grandfather has type 2 diabetes and an unspecified heart condition  Paternal grandmother  at age 77 due to COPD. She may have had thrombocytopenia  Mother and her 7 siblings do not have thrombocytopenia.   Maternal grandfather  at age 89 due to \"old age\"  Maternal grandmother  at age 93 due to a stroke.  The family history is otherwise negative for thrombocytopenia.  Maternal ancestry is [place]. Paternal ancestry is [place] and [place]. There is no reported consanguinity.    Discussion:   We discussed that Lauren's personal and and family history are suggestive of hereditary thrombocytopenia. We reviewed that Rinku Melton PA-C is interested in genetic testing for Lauren in order to clarify her clinical diagnosis. Lauren was " previously suspected of having ITP. Identifying an alternate diagnosis for Lauren Aguilar via genetic sequencing would both clarify her diagnosis and would avoid her potentially being treated with medications for ITP that will be of no benefit to her. Additionally, Rinku Melton PA-C mentioned that some conditions he would like to do genetic testing for can be associated with other clinical features (outside the coagulation system) for which screening and condition-specific treatments would be recommended.     Genetic Testing Plan:   Consent was obtained for genetic testing of custom thrombocytopenia panel of 32 genes. Testing involves sequencing and copy number variant analysis and will be performed at the [facility]. We discussed the possible results of genetic testing, including positive, negative, and variants of uncertain significance.  Lauren elected to proceed with prior-authorization to determine insurance coverage, which typically takes approximately 4 weeks. If she chooses to proceed with genetic testing at that time, the results will be available approximately 4-6 weeks later. I will call Lauren to discuss the results once they are available.      This testing is being ordered due to Lauren's personal and and family history are suggestive of hereditary thrombocytopenia. Genetic testing would both clarify her diagnosis and would avoid her potentially being treated with medications for ITP that will be of no benefit to her. Additionally, certain genetic conditions can be associated with other clinical features (outside the coagulation system) for which screening and condition-specific treatments would be recommended for Lauren and possibly her relatives.     Custom list of genes ordered from the [facility]:  ABCG5, ABCG8, ACTB, ACTN1, ANKRD26, CYCS, DIAPH1, ETV6, FLI1, FLNA, GATA1, GFI1B, GP1BA, GP1BB, GP6, GP9, HOXA11, MECOM, MPL, MYH9,  P2RY12, PLAU, PRKACG, RASGRP2, RBM8A, RUNX1, SLFN14, SRC, TBXA2R, TPM4, TUBB1, WAS    Plan:   A three generation pedigree was obtained and scanned into the EMR.  Lauren will be contacted by a MDL  with the results of prior authorization.   If Lauren chooses to proceed with genetic testing, I will contact her when results are available to discuss.   Additional questions or concerns were denied.    Approximate Time Spent on the Phone: 40 minutes.     Qing Shelton, PhD, MS, [facility]  Genetic Counselor

## 2025-06-25 ENCOUNTER — DOCUMENTATION ONLY (OUTPATIENT)
Dept: LAB | Facility: CLINIC | Age: 35
End: 2025-06-25
Payer: COMMERCIAL

## 2025-06-26 ENCOUNTER — RESULTS FOLLOW-UP (OUTPATIENT)
Dept: OBGYN | Facility: CLINIC | Age: 35
End: 2025-06-26

## 2025-06-26 ENCOUNTER — PRENATAL OFFICE VISIT (OUTPATIENT)
Dept: OBGYN | Facility: CLINIC | Age: 35
End: 2025-06-26
Payer: COMMERCIAL

## 2025-06-26 VITALS
OXYGEN SATURATION: 95 % | DIASTOLIC BLOOD PRESSURE: 84 MMHG | BODY MASS INDEX: 38.41 KG/M2 | WEIGHT: 210 LBS | HEART RATE: 74 BPM | SYSTOLIC BLOOD PRESSURE: 124 MMHG

## 2025-06-26 DIAGNOSIS — E55.9 VITAMIN D DEFICIENCY: ICD-10-CM

## 2025-06-26 DIAGNOSIS — Z30.430 ENCOUNTER FOR INSERTION OF INTRAUTERINE CONTRACEPTIVE DEVICE: ICD-10-CM

## 2025-06-26 DIAGNOSIS — B49 FUNGAL INFECTION: ICD-10-CM

## 2025-06-26 DIAGNOSIS — R35.0 URINARY FREQUENCY: ICD-10-CM

## 2025-06-26 DIAGNOSIS — Z30.430 ENCOUNTER FOR IUD INSERTION: Primary | ICD-10-CM

## 2025-06-26 LAB
HCG UR QL: NEGATIVE
HOLD SPECIMEN: NORMAL
VIT D+METAB SERPL-MCNC: 42 NG/ML (ref 20–50)

## 2025-06-26 RX ORDER — NYSTATIN 100000 U/G
CREAM TOPICAL 2 TIMES DAILY
Qty: 30 G | Refills: 0 | Status: SHIPPED | OUTPATIENT
Start: 2025-06-26 | End: 2025-07-10

## 2025-06-26 NOTE — PROGRESS NOTES
IUD Insertion:  CONSULT:    Is a pregnancy test required: Yes.  Was it positive or negative?  Negative  Was a consent obtained?  Yes    Subjective: Lauren Montague is a 35 year old  presents for IUD and desires Kyleena type IUD.    Patient has been given the opportunity to ask questions about all forms of birth control, including all options appropriate for Lauren Montague. Discussed that no method of birth control, except abstinence is 100% effective against pregnancy or sexually transmitted infection.     Lauren Montague understands she may have the IUD removed at any time. IUD should be removed by a health care provider.    The entire insertion procedure was reviewed with the patient, including care after placement.    Patient's last menstrual period was 2024. Last sexual activity: prior to . No allergy to betadine or shellfish.   HCG Qual Urine   Date Value Ref Range Status   2021 Negative Negative Final     hCG Urine Qualitative   Date Value Ref Range Status   2025 Negative Negative Final     Comment:     This test is for screening purposes.  Results should be interpreted along with the clinical picture.  Confirmation testing is available if warranted by ordering XFJ021, HCG Quantitative Pregnancy.         /84   Pulse 74   Wt 95.3 kg (210 lb)   LMP 2024   SpO2 95%   Breastfeeding Yes   BMI 38.41 kg/m      Pelvic Exam:   EG/BUS: normal genital architecture without lesions, erythema or abnormal secretions.   Vagina: moist, pink, rugae with physiologic discharge and secretions  Cervix: nulliparous no lesions and pink, moist, closed, without lesion or CMT      PROCEDURE NOTE: -- IUD Insertion    Reason for Insertion: contraception    Premedicated with ibuprofen.  Under sterile technique, cervix was visualized with speculum and prepped with Betadine solution swab x 3. Tenaculum was placed for stability. The uterus was gently straightened, Sound initially did not  pass through external os.  It was gently dilated with os finder.  Sound again did not pass so pipelle used to sound to 7cm. IUD prepared for placement, and IUD inserted according to 's instructions without difficulty or significant resitance, and deployed at the fundus. The strings were visualized and trimmed to 2.5 cm from the external os. Tenaculum was removed and hemostasis noted. Speculum removed.  Patient tolerated procedure well.    EBL: minimal    Complications: none    ASSESSMENT:     ICD-10-CM    1. Encounter for IUD insertion  Z30.430 HCG Qual, Urine (FND0265)     Extra Urine (LAB USE ONLY)     US Transvaginal Pelvic Non-OB      2. Fungal infection  B49 nystatin (MYCOSTATIN) 635609 UNIT/GM external cream      3. Urinary frequency  R35.0 Physical Therapy  Referral      4. Encounter for insertion of intrauterine contraceptive device  Z30.430            PLAN:    Given 's handouts, including when to have IUD removed, list of danger s/sx, side effects and follow up recommended. Encouraged condom use for prevention of STD. She was advised to use pain medications (ibuprofen) as needed for mild to moderate pain.  Ordered pelvic US to confirm appropriate placement given challenge with sounding.  She will have this done at her convenience    Natali Crawford MD

## 2025-06-26 NOTE — PROGRESS NOTES
CC: postpartum visit    SUBJECTIVE:  Lauren Montague is a 35 year old female  here for a postpartum visit.  She had a  Section  on 25 delivering a healthy baby boy weighing 6 lbs 10.5 oz at 36w5d due to history of large fundal myomectomy.          2024     1:07 PM   PHQ-9 SCORE   PHQ-9 Total Score 3          No data to display              Already taking 1000 units per day of Vit D and wondering if she should take more.  Prefers to take supplements herself rather than try to remember Vitamin D drops for baby.    Previously thought she was done having children after 2, but found this delivery so healing and pp has been going so well she's considered one more.    delivery complications:  No  breast feeding:  Yes, going well  bladder problems:  No  bowel problems/hemorrhoids:  No  episiotomy/laceration/incision healed? Yes.  Does still have an area that gets red and irritated and sometimes has an odor if she's not really diligent with hygiene, drying  vaginal flow:  None  Kings:  No  contraception:  IUD  emotional adjustment:  doing well and happy  back to work:  Sept    Current Outpatient Medications   Medication Sig Dispense Refill    folic acid (FOLVITE) 1 MG tablet Take 1 mg by mouth 2 times daily.      levETIRAcetam (KEPPRA) 500 MG tablet Take 2 tablets (1,000 mg) by mouth 2 times daily.      loratadine (CLARITIN) 10 MG tablet Take 10 mg by mouth every morning.      Prenatal Vit-Fe Fumarate-FA (PRENATAL MULTIVITAMIN W/IRON) 27-0.8 MG tablet Take 1 tablet by mouth every morning.      Vitamin D, Cholecalciferol, 25 MCG (1000 UT) CAPS Take 2,000 Units by mouth every morning.      oxyCODONE (ROXICODONE) 5 MG tablet Take 1 tablet (5 mg) by mouth once as needed for severe pain. 6 tablet 0     No current facility-administered medications for this visit.       OBJECTIVE:  Blood pressure 124/84, pulse 74, weight 95.3 kg (210 lb), last menstrual period 2024, SpO2 95%, currently  breastfeeding.   General - pleasant female in no acute distress.  Breast - no nodularity, asymmetry or nipple discharge bilaterally.  Abdomen - soft, nontender, nondistended, no hepatosplenomegaly.  Incision healed well but with evidence of fungal infection around incision on right side.  Pelvic - EG: normal adult female, BUS: within normal limits, Vagina: well rugated, no discharge, Cervix: normal, no lesions or CMT, Uterus: firm, normal sized and nontender, Adnexae: no masses or tenderness.  Rectovaginal - deferred.    ASSESSMENT:  35 year old  with normal postpartum exam    PLAN:  May resume normal activities without restrictions  Pap smear was done today.  Due 2026 and prefers to get it then rather than today.    Nystatin cream prescribed for likely fundal infection around incision.    The patient will use IUD for birth control.   Discussed recommended interpregnancy interval of at least 12 months, but ideally 18 months.  Full counseling was provided, and all questions answered. Compliance is strongly emphasized.    Return to clinic in one year for an annual, when due for a pap smear or PRN.    Natali Crawford MD    -------------

## 2025-07-02 ENCOUNTER — LAB (OUTPATIENT)
Dept: LAB | Facility: CLINIC | Age: 35
End: 2025-07-02
Payer: COMMERCIAL

## 2025-07-02 ENCOUNTER — RESULTS FOLLOW-UP (OUTPATIENT)
Dept: HEMATOLOGY | Facility: CLINIC | Age: 35
End: 2025-07-02

## 2025-07-02 DIAGNOSIS — D69.6 THROMBOCYTOPENIA: ICD-10-CM

## 2025-07-02 DIAGNOSIS — Z83.2 FAMILY HISTORY OF THROMBOCYTOPENIA: ICD-10-CM

## 2025-07-02 LAB
ERYTHROCYTE [DISTWIDTH] IN BLOOD BY AUTOMATED COUNT: 13.3 % (ref 10–15)
HCT VFR BLD AUTO: 41.3 % (ref 35–47)
HGB BLD-MCNC: 13.2 G/DL (ref 11.7–15.7)
MCH RBC QN AUTO: 27.1 PG (ref 26.5–33)
MCHC RBC AUTO-ENTMCNC: 32 G/DL (ref 31.5–36.5)
MCV RBC AUTO: 85 FL (ref 78–100)
PLATELET # BLD AUTO: 130 10E3/UL (ref 150–450)
RBC # BLD AUTO: 4.87 10E6/UL (ref 3.8–5.2)
WBC # BLD AUTO: 6.5 10E3/UL (ref 4–11)

## 2025-07-02 PROCEDURE — 85027 COMPLETE CBC AUTOMATED: CPT

## 2025-07-02 PROCEDURE — 36415 COLL VENOUS BLD VENIPUNCTURE: CPT

## 2025-07-14 ENCOUNTER — HOSPITAL ENCOUNTER (OUTPATIENT)
Dept: ULTRASOUND IMAGING | Facility: CLINIC | Age: 35
Discharge: HOME | End: 2025-07-14
Attending: OBSTETRICS & GYNECOLOGY | Admitting: OBSTETRICS & GYNECOLOGY
Payer: COMMERCIAL

## 2025-07-14 DIAGNOSIS — Z30.430 ENCOUNTER FOR IUD INSERTION: ICD-10-CM

## 2025-07-14 PROCEDURE — 76830 TRANSVAGINAL US NON-OB: CPT

## 2025-08-13 ENCOUNTER — VIRTUAL VISIT (OUTPATIENT)
Dept: FAMILY MEDICINE | Facility: CLINIC | Age: 35
End: 2025-08-13
Payer: COMMERCIAL

## 2025-08-13 DIAGNOSIS — Z13.29 SCREENING FOR THYROID DISORDER: ICD-10-CM

## 2025-08-13 DIAGNOSIS — J30.89 SEASONAL ALLERGIC RHINITIS DUE TO OTHER ALLERGIC TRIGGER: ICD-10-CM

## 2025-08-13 DIAGNOSIS — G40.909 NONINTRACTABLE EPILEPSY WITHOUT STATUS EPILEPTICUS, UNSPECIFIED EPILEPSY TYPE (H): ICD-10-CM

## 2025-08-13 DIAGNOSIS — D69.6 THROMBOCYTOPENIA, UNSPECIFIED: ICD-10-CM

## 2025-08-13 DIAGNOSIS — Z76.89 ENCOUNTER TO ESTABLISH CARE: Primary | ICD-10-CM

## 2025-08-13 DIAGNOSIS — F41.1 GAD (GENERALIZED ANXIETY DISORDER): ICD-10-CM

## 2025-08-13 DIAGNOSIS — Z97.5 IUD (INTRAUTERINE DEVICE) IN PLACE: ICD-10-CM

## 2025-08-13 DIAGNOSIS — F33.41 RECURRENT MAJOR DEPRESSIVE DISORDER, IN PARTIAL REMISSION: ICD-10-CM

## 2025-08-13 DIAGNOSIS — Z13.220 SCREENING FOR LIPID DISORDERS: ICD-10-CM

## 2025-08-13 DIAGNOSIS — Z13.1 SCREENING FOR DIABETES MELLITUS (DM): ICD-10-CM

## 2025-08-13 PROBLEM — Z23 NEED FOR DIPHTHERIA-TETANUS-PERTUSSIS (TDAP) VACCINE: Status: RESOLVED | Noted: 2024-10-04 | Resolved: 2025-08-13

## 2025-08-13 PROCEDURE — 98002 SYNCH AUDIO-VIDEO NEW MOD 45: CPT | Performed by: STUDENT IN AN ORGANIZED HEALTH CARE EDUCATION/TRAINING PROGRAM

## 2025-08-13 ASSESSMENT — PATIENT HEALTH QUESTIONNAIRE - PHQ9: SUM OF ALL RESPONSES TO PHQ QUESTIONS 1-9: 0

## 2025-08-14 ENCOUNTER — PATIENT OUTREACH (OUTPATIENT)
Dept: CARE COORDINATION | Facility: CLINIC | Age: 35
End: 2025-08-14
Payer: COMMERCIAL

## 2025-08-18 ENCOUNTER — PATIENT OUTREACH (OUTPATIENT)
Dept: CARE COORDINATION | Facility: CLINIC | Age: 35
End: 2025-08-18
Payer: COMMERCIAL

## 2025-08-23 ENCOUNTER — LAB (OUTPATIENT)
Dept: LAB | Facility: CLINIC | Age: 35
End: 2025-08-23
Payer: COMMERCIAL

## 2025-08-23 DIAGNOSIS — D69.6 THROMBOCYTOPENIA: ICD-10-CM

## 2025-08-23 DIAGNOSIS — Z13.1 SCREENING FOR DIABETES MELLITUS (DM): ICD-10-CM

## 2025-08-23 DIAGNOSIS — Z13.29 SCREENING FOR THYROID DISORDER: ICD-10-CM

## 2025-08-23 DIAGNOSIS — Z13.220 SCREENING FOR LIPID DISORDERS: ICD-10-CM

## 2025-08-23 LAB
CHOLEST SERPL-MCNC: 198 MG/DL
EST. AVERAGE GLUCOSE BLD GHB EST-MCNC: 103 MG/DL
FASTING STATUS PATIENT QL REPORTED: ABNORMAL
HBA1C MFR BLD: 5.2 % (ref 0–5.6)
HDLC SERPL-MCNC: 64 MG/DL
LDLC SERPL CALC-MCNC: 123 MG/DL
MCV RBC AUTO: 84.4 FL (ref 78–100)
NONHDLC SERPL-MCNC: 134 MG/DL
PLATELET # BLD AUTO: 128 10E3/UL (ref 150–450)
TRIGL SERPL-MCNC: 57 MG/DL
TSH SERPL DL<=0.005 MIU/L-ACNC: 0.95 UIU/ML (ref 0.3–4.2)

## 2025-08-23 PROCEDURE — 36415 COLL VENOUS BLD VENIPUNCTURE: CPT

## 2025-08-23 PROCEDURE — 83036 HEMOGLOBIN GLYCOSYLATED A1C: CPT

## 2025-08-23 PROCEDURE — 85049 AUTOMATED PLATELET COUNT: CPT

## 2025-08-23 PROCEDURE — 80061 LIPID PANEL: CPT

## 2025-08-23 PROCEDURE — 84443 ASSAY THYROID STIM HORMONE: CPT

## 2025-08-25 ENCOUNTER — LAB (OUTPATIENT)
Dept: LAB | Facility: CLINIC | Age: 35
End: 2025-08-25
Payer: COMMERCIAL

## 2025-08-25 DIAGNOSIS — D69.6 THROMBOCYTOPENIA: Primary | ICD-10-CM

## 2025-08-26 LAB — INTERPRETATION SERPL IEP-IMP: NORMAL

## 2025-09-02 ENCOUNTER — THERAPY VISIT (OUTPATIENT)
Dept: PHYSICAL THERAPY | Facility: CLINIC | Age: 35
End: 2025-09-02
Attending: OBSTETRICS & GYNECOLOGY
Payer: COMMERCIAL

## 2025-09-02 ENCOUNTER — TELEPHONE (OUTPATIENT)
Dept: HEMATOLOGY | Facility: CLINIC | Age: 35
End: 2025-09-02

## 2025-09-02 DIAGNOSIS — R35.0 URINARY FREQUENCY: ICD-10-CM

## 2025-09-02 DIAGNOSIS — N81.89 PELVIC FLOOR WEAKNESS IN FEMALE: Primary | ICD-10-CM

## 2025-09-02 PROCEDURE — 97530 THERAPEUTIC ACTIVITIES: CPT | Mod: GP

## 2025-09-02 PROCEDURE — 97110 THERAPEUTIC EXERCISES: CPT | Mod: GP

## 2025-09-02 PROCEDURE — 97161 PT EVAL LOW COMPLEX 20 MIN: CPT | Mod: GP

## 2025-09-04 ENCOUNTER — TELEPHONE (OUTPATIENT)
Dept: HEMATOLOGY | Facility: CLINIC | Age: 35
End: 2025-09-04
Payer: COMMERCIAL

## (undated) DEVICE — CATH TRAY FOLEY 16FR BARDEX W/DRAIN BAG STATLOCK 300316A

## (undated) DEVICE — SOL NACL 0.9% IRRIG 1000ML BOTTLE 07138-09

## (undated) DEVICE — PACK C-SECTION LF PL15OTA83B

## (undated) DEVICE — SU MONOCRYL 4-0 PS-2 18" UND Y496G

## (undated) DEVICE — ESU PENCIL W/SMOKE EVAC NEPTUNE STRYKER 0703-046-000

## (undated) DEVICE — SOL WATER IRRIG 1000ML BOTTLE 07139-09

## (undated) DEVICE — SU PLAIN 2-0 CT 27" 853H

## (undated) DEVICE — GLOVE ESTEEM POWDER FREE SMT 6.5  2D72PT65

## (undated) DEVICE — SU VICRYL 0 CT-1 36" J346H

## (undated) DEVICE — STRAP KNEE/BODY 31143004

## (undated) DEVICE — STOCKING SLEEVE COMPRESSION CALF LG

## (undated) DEVICE — SU MONOCRYL 0 CT-1 36" Y346H

## (undated) DEVICE — PREP CHLORAPREP 26ML TINTED ORANGE  260815

## (undated) DEVICE — GLOVE PROTEXIS BLUE W/NEU-THERA 7.0  2D73EB70

## (undated) DEVICE — DRSG STERI STRIP 1/4X3" R1541

## (undated) RX ORDER — CEFAZOLIN SODIUM 1 G/3ML
INJECTION, POWDER, FOR SOLUTION INTRAMUSCULAR; INTRAVENOUS
Status: DISPENSED
Start: 2025-04-30

## (undated) RX ORDER — PHENYLEPHRINE HCL IN 0.9% NACL 50MG/250ML
PLASTIC BAG, INJECTION (ML) INTRAVENOUS
Status: DISPENSED
Start: 2025-04-30

## (undated) RX ORDER — INDOMETHACIN 25 MG/1
CAPSULE ORAL
Status: DISPENSED
Start: 2025-04-30

## (undated) RX ORDER — FENTANYL CITRATE-0.9 % NACL/PF 10 MCG/ML
PLASTIC BAG, INJECTION (ML) INTRAVENOUS
Status: DISPENSED
Start: 2025-04-30

## (undated) RX ORDER — BUPIVACAINE HYDROCHLORIDE 2.5 MG/ML
INJECTION, SOLUTION EPIDURAL; INFILTRATION; INTRACAUDAL; PERINEURAL
Status: DISPENSED
Start: 2025-04-30

## (undated) RX ORDER — MISOPROSTOL 200 UG/1
TABLET ORAL
Status: DISPENSED
Start: 2025-04-30

## (undated) RX ORDER — OXYTOCIN/0.9 % SODIUM CHLORIDE 30/500 ML
PLASTIC BAG, INJECTION (ML) INTRAVENOUS
Status: DISPENSED
Start: 2025-04-30

## (undated) RX ORDER — KETOROLAC TROMETHAMINE 30 MG/ML
INJECTION, SOLUTION INTRAMUSCULAR; INTRAVENOUS
Status: DISPENSED
Start: 2025-04-30

## (undated) RX ORDER — EPHEDRINE SULFATE 50 MG/ML
INJECTION, SOLUTION INTRAMUSCULAR; INTRAVENOUS; SUBCUTANEOUS
Status: DISPENSED
Start: 2025-04-30

## (undated) RX ORDER — DEXAMETHASONE SODIUM PHOSPHATE 4 MG/ML
INJECTION, SOLUTION INTRA-ARTICULAR; INTRALESIONAL; INTRAMUSCULAR; INTRAVENOUS; SOFT TISSUE
Status: DISPENSED
Start: 2025-04-30

## (undated) RX ORDER — FENTANYL CITRATE 50 UG/ML
INJECTION, SOLUTION INTRAMUSCULAR; INTRAVENOUS
Status: DISPENSED
Start: 2025-04-30

## (undated) RX ORDER — CHLOROPROCAINE HYDROCHLORIDE 30 MG/ML
INJECTION, SOLUTION EPIDURAL; INFILTRATION; INTRACAUDAL; PERINEURAL
Status: DISPENSED
Start: 2025-04-30

## (undated) RX ORDER — MORPHINE SULFATE 1 MG/ML
INJECTION, SOLUTION EPIDURAL; INTRATHECAL; INTRAVENOUS
Status: DISPENSED
Start: 2025-04-30

## (undated) RX ORDER — ONDANSETRON 2 MG/ML
INJECTION INTRAMUSCULAR; INTRAVENOUS
Status: DISPENSED
Start: 2025-04-30

## (undated) RX ORDER — TRANEXAMIC ACID 10 MG/ML
INJECTION, SOLUTION INTRAVENOUS
Status: DISPENSED
Start: 2025-04-30

## (undated) RX ORDER — EPHEDRINE SULFATE 50 MG/ML
INJECTION, SOLUTION INTRAVENOUS
Status: DISPENSED
Start: 2025-04-30